# Patient Record
Sex: MALE | Race: WHITE | Employment: OTHER | URBAN - METROPOLITAN AREA
[De-identification: names, ages, dates, MRNs, and addresses within clinical notes are randomized per-mention and may not be internally consistent; named-entity substitution may affect disease eponyms.]

---

## 2017-03-10 ENCOUNTER — HOSPITAL ENCOUNTER (OUTPATIENT)
Dept: OTHER | Age: 77
Discharge: HOME | End: 2017-03-10
Attending: FAMILY MEDICINE | Admitting: FAMILY MEDICINE

## 2017-03-11 LAB
A/G RATIO: 1.2 (ref 1.1–2.2)
ALBUMIN SERPL-MCNC: 4.2 G/DL (ref 3.4–5)
ALP BLD-CCNC: 97 U/L (ref 40–129)
ALT SERPL-CCNC: 23 U/L (ref 10–40)
ANION GAP SERPL CALCULATED.3IONS-SCNC: 13 MMOL/L (ref 3–16)
AST SERPL-CCNC: 17 U/L (ref 15–37)
BASOPHILS ABSOLUTE: 0.2 K/UL (ref 0–0.2)
BASOPHILS RELATIVE PERCENT: 1.9 %
BILIRUB SERPL-MCNC: 0.4 MG/DL (ref 0–1)
BUN BLDV-MCNC: 25 MG/DL (ref 7–20)
CALCIUM SERPL-MCNC: 9.9 MG/DL (ref 8.3–10.6)
CHLORIDE BLD-SCNC: 96 MMOL/L (ref 99–110)
CO2: 26 MMOL/L (ref 21–32)
CREAT SERPL-MCNC: 1.3 MG/DL (ref 0.8–1.3)
EOSINOPHILS ABSOLUTE: 0.3 K/UL (ref 0–0.6)
EOSINOPHILS RELATIVE PERCENT: 2.8 %
ESTIMATED AVERAGE GLUCOSE: 274.7 MG/DL
GFR AFRICAN AMERICAN: >60
GFR NON-AFRICAN AMERICAN: 54
GLOBULIN: 3.6 G/DL
GLUCOSE BLD-MCNC: 298 MG/DL (ref 70–99)
HBA1C MFR BLD: 11.2 %
HCT VFR BLD CALC: 48.2 % (ref 40.5–52.5)
HEMOGLOBIN: 15.9 G/DL (ref 13.5–17.5)
LYMPHOCYTES ABSOLUTE: 3.3 K/UL (ref 1–5.1)
LYMPHOCYTES RELATIVE PERCENT: 28.4 %
MCH RBC QN AUTO: 30 PG (ref 26–34)
MCHC RBC AUTO-ENTMCNC: 33.1 G/DL (ref 31–36)
MCV RBC AUTO: 90.8 FL (ref 80–100)
MONOCYTES ABSOLUTE: 0.8 K/UL (ref 0–1.3)
MONOCYTES RELATIVE PERCENT: 6.9 %
NEUTROPHILS ABSOLUTE: 6.9 K/UL (ref 1.7–7.7)
NEUTROPHILS RELATIVE PERCENT: 60 %
PDW BLD-RTO: 15.2 % (ref 12.4–15.4)
PLATELET # BLD: 270 K/UL (ref 135–450)
PMV BLD AUTO: 8.8 FL (ref 5–10.5)
POTASSIUM SERPL-SCNC: 5.1 MMOL/L (ref 3.5–5.1)
RBC # BLD: 5.31 M/UL (ref 4.2–5.9)
SODIUM BLD-SCNC: 135 MMOL/L (ref 136–145)
TOTAL PROTEIN: 7.8 G/DL (ref 6.4–8.2)
WBC # BLD: 11.5 K/UL (ref 4–11)

## 2017-03-13 ENCOUNTER — HOSPITAL ENCOUNTER (OUTPATIENT)
Dept: PREADMISSION TESTING | Age: 77
Discharge: HOME | End: 2017-03-13
Admitting: PODIATRIST

## 2017-03-13 VITALS — HEIGHT: 69 IN | BODY MASS INDEX: 33.03 KG/M2 | WEIGHT: 223 LBS

## 2017-03-13 RX ORDER — CHLORHEXIDINE GLUCONATE 0.12 MG/ML
15 RINSE ORAL 2 TIMES DAILY
Status: CANCELLED | OUTPATIENT
Start: 2017-03-13

## 2017-03-16 RX ORDER — ONDANSETRON 2 MG/ML
4 INJECTION INTRAMUSCULAR; INTRAVENOUS ONCE
Status: CANCELLED | OUTPATIENT
Start: 2017-03-16 | End: 2017-03-16

## 2017-03-16 RX ORDER — ROPIVACAINE HYDROCHLORIDE 5 MG/ML
30 INJECTION, SOLUTION EPIDURAL; INFILTRATION; PERINEURAL ONCE
Status: CANCELLED | OUTPATIENT
Start: 2017-03-16 | End: 2017-03-16

## 2017-03-16 RX ORDER — MIDAZOLAM HYDROCHLORIDE 1 MG/ML
2 INJECTION INTRAMUSCULAR; INTRAVENOUS
Status: CANCELLED | OUTPATIENT
Start: 2017-03-16 | End: 2017-03-16

## 2017-03-16 RX ORDER — GLYCOPYRROLATE 0.2 MG/ML
0.1 INJECTION INTRAMUSCULAR; INTRAVENOUS ONCE
Status: CANCELLED | OUTPATIENT
Start: 2017-03-16 | End: 2017-03-16

## 2017-03-16 RX ORDER — SODIUM CHLORIDE 0.9 % (FLUSH) 0.9 %
10 SYRINGE (ML) INJECTION EVERY 12 HOURS SCHEDULED
Status: CANCELLED | OUTPATIENT
Start: 2017-03-16

## 2017-03-16 RX ORDER — FENTANYL CITRATE 50 UG/ML
100 INJECTION, SOLUTION INTRAMUSCULAR; INTRAVENOUS ONCE
Status: CANCELLED | OUTPATIENT
Start: 2017-03-16 | End: 2017-03-16

## 2017-03-16 RX ORDER — SODIUM CHLORIDE, SODIUM LACTATE, POTASSIUM CHLORIDE, CALCIUM CHLORIDE 600; 310; 30; 20 MG/100ML; MG/100ML; MG/100ML; MG/100ML
INJECTION, SOLUTION INTRAVENOUS CONTINUOUS
Status: CANCELLED | OUTPATIENT
Start: 2017-03-16

## 2017-03-16 RX ORDER — DEXAMETHASONE SODIUM PHOSPHATE 4 MG/ML
10 INJECTION, SOLUTION INTRA-ARTICULAR; INTRALESIONAL; INTRAMUSCULAR; INTRAVENOUS; SOFT TISSUE ONCE
Status: CANCELLED | OUTPATIENT
Start: 2017-03-16 | End: 2017-03-16

## 2017-03-16 RX ORDER — SODIUM CHLORIDE 0.9 % (FLUSH) 0.9 %
10 SYRINGE (ML) INJECTION PRN
Status: CANCELLED | OUTPATIENT
Start: 2017-03-16

## 2017-03-17 ENCOUNTER — HOSPITAL ENCOUNTER (OUTPATIENT)
Dept: SURGERY | Age: 77
Discharge: HOME | End: 2017-03-17
Admitting: PODIATRIST

## 2017-03-28 ENCOUNTER — HOSPITAL ENCOUNTER (OUTPATIENT)
Dept: PREADMISSION TESTING | Age: 77
Discharge: HOME | End: 2017-03-28
Attending: PODIATRIST | Admitting: PODIATRIST

## 2017-03-28 VITALS — WEIGHT: 223 LBS | BODY MASS INDEX: 33.03 KG/M2 | HEIGHT: 69 IN

## 2017-03-31 ENCOUNTER — HOSPITAL ENCOUNTER (OUTPATIENT)
Dept: SURGERY | Age: 77
Discharge: HOME | End: 2017-03-31
Admitting: PODIATRIST

## 2017-03-31 VITALS
OXYGEN SATURATION: 92 % | SYSTOLIC BLOOD PRESSURE: 134 MMHG | BODY MASS INDEX: 33.03 KG/M2 | RESPIRATION RATE: 19 BRPM | WEIGHT: 223 LBS | HEART RATE: 84 BPM | HEIGHT: 69 IN | TEMPERATURE: 97.1 F | DIASTOLIC BLOOD PRESSURE: 59 MMHG

## 2017-03-31 DIAGNOSIS — G89.18 POST-OP PAIN: Primary | ICD-10-CM

## 2017-03-31 LAB
GLUCOSE BLD-MCNC: 120 MG/DL (ref 70–99)
GLUCOSE BLD-MCNC: 163 MG/DL (ref 70–99)
PERFORMED ON: ABNORMAL
PERFORMED ON: ABNORMAL

## 2017-03-31 RX ORDER — ONDANSETRON 2 MG/ML
4 INJECTION INTRAMUSCULAR; INTRAVENOUS ONCE
Status: COMPLETED | OUTPATIENT
Start: 2017-03-31 | End: 2017-03-31

## 2017-03-31 RX ORDER — FENTANYL CITRATE 50 UG/ML
100 INJECTION, SOLUTION INTRAMUSCULAR; INTRAVENOUS ONCE
Status: COMPLETED | OUTPATIENT
Start: 2017-03-31 | End: 2017-03-31

## 2017-03-31 RX ORDER — DEXAMETHASONE SODIUM PHOSPHATE 4 MG/ML
10 INJECTION, SOLUTION INTRA-ARTICULAR; INTRALESIONAL; INTRAMUSCULAR; INTRAVENOUS; SOFT TISSUE ONCE
Status: COMPLETED | OUTPATIENT
Start: 2017-03-31 | End: 2017-03-31

## 2017-03-31 RX ORDER — ROPIVACAINE HYDROCHLORIDE 5 MG/ML
INJECTION, SOLUTION EPIDURAL; INFILTRATION; PERINEURAL
Status: DISPENSED
Start: 2017-03-31 | End: 2017-03-31

## 2017-03-31 RX ORDER — MEPERIDINE HYDROCHLORIDE 25 MG/ML
12.5 INJECTION INTRAMUSCULAR; INTRAVENOUS; SUBCUTANEOUS EVERY 5 MIN PRN
Status: DISCONTINUED | OUTPATIENT
Start: 2017-03-31 | End: 2017-04-01 | Stop reason: HOSPADM

## 2017-03-31 RX ORDER — SODIUM CHLORIDE 0.9 % (FLUSH) 0.9 %
10 SYRINGE (ML) INJECTION EVERY 12 HOURS SCHEDULED
Status: DISCONTINUED | OUTPATIENT
Start: 2017-03-31 | End: 2017-04-01 | Stop reason: HOSPADM

## 2017-03-31 RX ORDER — PROMETHAZINE HYDROCHLORIDE 25 MG/ML
6.25 INJECTION, SOLUTION INTRAMUSCULAR; INTRAVENOUS
Status: ACTIVE | OUTPATIENT
Start: 2017-03-31 | End: 2017-03-31

## 2017-03-31 RX ORDER — OXYCODONE HYDROCHLORIDE AND ACETAMINOPHEN 5; 325 MG/1; MG/1
1 TABLET ORAL ONCE
Status: DISCONTINUED | OUTPATIENT
Start: 2017-03-31 | End: 2017-04-01 | Stop reason: HOSPADM

## 2017-03-31 RX ORDER — SODIUM CHLORIDE 0.9 % (FLUSH) 0.9 %
10 SYRINGE (ML) INJECTION PRN
Status: DISCONTINUED | OUTPATIENT
Start: 2017-03-31 | End: 2017-04-01 | Stop reason: HOSPADM

## 2017-03-31 RX ORDER — GLYCOPYRROLATE 0.2 MG/ML
0.1 INJECTION INTRAMUSCULAR; INTRAVENOUS ONCE
Status: COMPLETED | OUTPATIENT
Start: 2017-03-31 | End: 2017-03-31

## 2017-03-31 RX ORDER — IBUPROFEN 800 MG/1
800 TABLET ORAL EVERY 8 HOURS PRN
Qty: 30 TABLET | Refills: 0 | Status: ON HOLD | OUTPATIENT
Start: 2017-03-31 | End: 2020-10-14 | Stop reason: HOSPADM

## 2017-03-31 RX ORDER — DIPHENHYDRAMINE HYDROCHLORIDE 50 MG/ML
12.5 INJECTION INTRAMUSCULAR; INTRAVENOUS
Status: ACTIVE | OUTPATIENT
Start: 2017-03-31 | End: 2017-03-31

## 2017-03-31 RX ORDER — CHLORHEXIDINE GLUCONATE 0.12 MG/ML
15 RINSE ORAL 2 TIMES DAILY
Status: DISCONTINUED | OUTPATIENT
Start: 2017-03-31 | End: 2017-04-01 | Stop reason: HOSPADM

## 2017-03-31 RX ORDER — OXYCODONE HYDROCHLORIDE AND ACETAMINOPHEN 5; 325 MG/1; MG/1
1 TABLET ORAL EVERY 6 HOURS PRN
Qty: 50 TABLET | Refills: 0 | Status: SHIPPED | OUTPATIENT
Start: 2017-03-31 | End: 2017-04-07

## 2017-03-31 RX ORDER — ROPIVACAINE HYDROCHLORIDE 5 MG/ML
30 INJECTION, SOLUTION EPIDURAL; INFILTRATION; PERINEURAL ONCE
Status: DISCONTINUED | OUTPATIENT
Start: 2017-03-31 | End: 2017-04-01 | Stop reason: HOSPADM

## 2017-03-31 RX ORDER — SODIUM CHLORIDE, SODIUM LACTATE, POTASSIUM CHLORIDE, CALCIUM CHLORIDE 600; 310; 30; 20 MG/100ML; MG/100ML; MG/100ML; MG/100ML
INJECTION, SOLUTION INTRAVENOUS CONTINUOUS
Status: DISCONTINUED | OUTPATIENT
Start: 2017-03-31 | End: 2017-04-01 | Stop reason: HOSPADM

## 2017-03-31 RX ORDER — LABETALOL HYDROCHLORIDE 5 MG/ML
5 INJECTION, SOLUTION INTRAVENOUS EVERY 10 MIN PRN
Status: DISCONTINUED | OUTPATIENT
Start: 2017-03-31 | End: 2017-04-01 | Stop reason: HOSPADM

## 2017-03-31 RX ORDER — HYDRALAZINE HYDROCHLORIDE 20 MG/ML
5 INJECTION INTRAMUSCULAR; INTRAVENOUS EVERY 10 MIN PRN
Status: DISCONTINUED | OUTPATIENT
Start: 2017-03-31 | End: 2017-04-01 | Stop reason: HOSPADM

## 2017-03-31 RX ORDER — ONDANSETRON 2 MG/ML
4 INJECTION INTRAMUSCULAR; INTRAVENOUS
Status: ACTIVE | OUTPATIENT
Start: 2017-03-31 | End: 2017-03-31

## 2017-03-31 RX ORDER — MIDAZOLAM HYDROCHLORIDE 1 MG/ML
2 INJECTION INTRAMUSCULAR; INTRAVENOUS
Status: COMPLETED | OUTPATIENT
Start: 2017-03-31 | End: 2017-03-31

## 2017-03-31 RX ORDER — FENTANYL CITRATE 50 UG/ML
25 INJECTION, SOLUTION INTRAMUSCULAR; INTRAVENOUS EVERY 5 MIN PRN
Status: DISCONTINUED | OUTPATIENT
Start: 2017-03-31 | End: 2017-04-01 | Stop reason: HOSPADM

## 2017-03-31 RX ADMIN — DEXAMETHASONE SODIUM PHOSPHATE 10 MG: 4 INJECTION, SOLUTION INTRA-ARTICULAR; INTRALESIONAL; INTRAMUSCULAR; INTRAVENOUS; SOFT TISSUE at 12:55

## 2017-03-31 RX ADMIN — FENTANYL CITRATE 100 MCG: 50 INJECTION, SOLUTION INTRAMUSCULAR; INTRAVENOUS at 12:51

## 2017-03-31 RX ADMIN — MIDAZOLAM HYDROCHLORIDE 2 MG: 1 INJECTION INTRAMUSCULAR; INTRAVENOUS at 12:50

## 2017-03-31 RX ADMIN — ONDANSETRON 4 MG: 2 INJECTION INTRAMUSCULAR; INTRAVENOUS at 12:54

## 2017-03-31 RX ADMIN — GLYCOPYRROLATE 0.1 MG: 0.2 INJECTION INTRAMUSCULAR; INTRAVENOUS at 12:59

## 2017-03-31 RX ADMIN — SODIUM CHLORIDE, SODIUM LACTATE, POTASSIUM CHLORIDE, CALCIUM CHLORIDE: 600; 310; 30; 20 INJECTION, SOLUTION INTRAVENOUS at 12:30

## 2017-03-31 ASSESSMENT — PAIN SCALES - GENERAL
PAINLEVEL_OUTOF10: 0

## 2017-03-31 ASSESSMENT — PAIN - FUNCTIONAL ASSESSMENT
PAIN_FUNCTIONAL_ASSESSMENT: 0-10

## 2017-05-04 ENCOUNTER — HOSPITAL ENCOUNTER (OUTPATIENT)
Dept: CT IMAGING | Age: 77
Discharge: HOME | End: 2017-05-04
Attending: UROLOGY | Admitting: UROLOGY

## 2017-05-04 DIAGNOSIS — C64.9 MALIGNANT NEOPLASM OF KIDNEY, EXCEPT PELVIS: ICD-10-CM

## 2017-05-04 DIAGNOSIS — N28.9 UNSPECIFIED DISORDER OF KIDNEY AND URETER: ICD-10-CM

## 2017-05-04 DIAGNOSIS — C64.9 MALIGNANT NEOPLASM OF KIDNEY EXCLUDING RENAL PELVIS (HCC): ICD-10-CM

## 2017-05-04 LAB
BUN BLDV-MCNC: 17 MG/DL (ref 7–20)
CREAT SERPL-MCNC: 1.3 MG/DL (ref 0.8–1.3)
GFR AFRICAN AMERICAN: >60
GFR NON-AFRICAN AMERICAN: 54

## 2017-06-05 ENCOUNTER — HOSPITAL ENCOUNTER (OUTPATIENT)
Dept: WOUND CARE | Age: 77
Discharge: HOME | End: 2017-06-05
Attending: PODIATRIST | Admitting: PODIATRIST

## 2017-06-05 VITALS
SYSTOLIC BLOOD PRESSURE: 148 MMHG | DIASTOLIC BLOOD PRESSURE: 76 MMHG | HEART RATE: 83 BPM | RESPIRATION RATE: 20 BRPM | TEMPERATURE: 96.8 F

## 2017-11-02 ENCOUNTER — HOSPITAL ENCOUNTER (OUTPATIENT)
Dept: VASCULAR LAB | Age: 77
Discharge: HOME | End: 2017-11-02
Attending: FAMILY MEDICINE | Admitting: FAMILY MEDICINE

## 2017-11-02 DIAGNOSIS — I25.10 ATHEROSCLEROTIC HEART DISEASE OF NATIVE CORONARY ARTERY WITHOUT ANGINA PECTORIS: ICD-10-CM

## 2017-11-02 LAB
LV EF: 53 %
LVEF MODALITY: NORMAL

## 2018-05-03 ENCOUNTER — OFFICE VISIT (OUTPATIENT)
Dept: PULMONOLOGY | Age: 78
End: 2018-05-03

## 2018-05-03 VITALS
SYSTOLIC BLOOD PRESSURE: 122 MMHG | DIASTOLIC BLOOD PRESSURE: 76 MMHG | WEIGHT: 234.4 LBS | OXYGEN SATURATION: 96 % | BODY MASS INDEX: 33.56 KG/M2 | HEIGHT: 70 IN | RESPIRATION RATE: 20 BRPM | HEART RATE: 71 BPM | TEMPERATURE: 98.2 F

## 2018-05-03 DIAGNOSIS — R53.83 FATIGUE, UNSPECIFIED TYPE: ICD-10-CM

## 2018-05-03 DIAGNOSIS — G47.10 HYPERSOMNIA: ICD-10-CM

## 2018-05-03 DIAGNOSIS — R06.83 SNORING: ICD-10-CM

## 2018-05-03 DIAGNOSIS — G47.30 OBSERVED SLEEP APNEA: Primary | ICD-10-CM

## 2018-05-03 PROCEDURE — 99214 OFFICE O/P EST MOD 30 MIN: CPT | Performed by: INTERNAL MEDICINE

## 2018-05-03 ASSESSMENT — SLEEP AND FATIGUE QUESTIONNAIRES
HOW LIKELY ARE YOU TO NOD OFF OR FALL ASLEEP WHILE LYING DOWN TO REST IN THE AFTERNOON WHEN CIRCUMSTANCES PERMIT: 2
HOW LIKELY ARE YOU TO NOD OFF OR FALL ASLEEP WHEN YOU ARE A PASSENGER IN A CAR FOR AN HOUR WITHOUT A BREAK: 0
HOW LIKELY ARE YOU TO NOD OFF OR FALL ASLEEP WHILE SITTING QUIETLY AFTER LUNCH WITHOUT ALCOHOL: 2
HOW LIKELY ARE YOU TO NOD OFF OR FALL ASLEEP WHILE SITTING INACTIVE IN A PUBLIC PLACE: 0
NECK CIRCUMFERENCE (INCHES): 17
ESS TOTAL SCORE: 10
HOW LIKELY ARE YOU TO NOD OFF OR FALL ASLEEP WHILE SITTING AND READING: 3
HOW LIKELY ARE YOU TO NOD OFF OR FALL ASLEEP WHILE SITTING AND TALKING TO SOMEONE: 0
HOW LIKELY ARE YOU TO NOD OFF OR FALL ASLEEP WHILE WATCHING TV: 3
HOW LIKELY ARE YOU TO NOD OFF OR FALL ASLEEP IN A CAR, WHILE STOPPED FOR A FEW MINUTES IN TRAFFIC: 0

## 2018-05-30 ENCOUNTER — HOSPITAL ENCOUNTER (OUTPATIENT)
Dept: SLEEP MEDICINE | Age: 78
Discharge: HOME | End: 2018-05-30
Attending: INTERNAL MEDICINE | Admitting: INTERNAL MEDICINE

## 2018-05-30 DIAGNOSIS — R06.83 SNORING: ICD-10-CM

## 2018-05-30 DIAGNOSIS — R53.83 FATIGUE, UNSPECIFIED TYPE: ICD-10-CM

## 2018-05-30 DIAGNOSIS — G47.30 OBSERVED SLEEP APNEA: ICD-10-CM

## 2018-05-30 DIAGNOSIS — G47.10 HYPERSOMNIA: ICD-10-CM

## 2018-05-31 ENCOUNTER — TELEPHONE (OUTPATIENT)
Dept: PULMONOLOGY | Age: 78
End: 2018-05-31

## 2018-05-31 DIAGNOSIS — G47.33 OSA (OBSTRUCTIVE SLEEP APNEA): Primary | ICD-10-CM

## 2018-06-14 ENCOUNTER — HOSPITAL ENCOUNTER (OUTPATIENT)
Dept: SLEEP MEDICINE | Age: 78
Discharge: HOME | End: 2018-06-16
Attending: INTERNAL MEDICINE | Admitting: INTERNAL MEDICINE

## 2018-06-14 DIAGNOSIS — G47.33 OSA (OBSTRUCTIVE SLEEP APNEA): ICD-10-CM

## 2018-06-27 DIAGNOSIS — G47.33 OSA (OBSTRUCTIVE SLEEP APNEA): Primary | ICD-10-CM

## 2018-09-07 ENCOUNTER — TELEPHONE (OUTPATIENT)
Dept: PULMONOLOGY | Age: 78
End: 2018-09-07

## 2018-09-07 NOTE — TELEPHONE ENCOUNTER
Patient scheduled 9/12/18 for 31-90 day.  Patient was informed to keep this appt to discuss alternatives

## 2018-09-12 ENCOUNTER — OFFICE VISIT (OUTPATIENT)
Dept: PULMONOLOGY | Age: 78
End: 2018-09-12

## 2018-09-12 VITALS
BODY MASS INDEX: 32.62 KG/M2 | HEART RATE: 60 BPM | SYSTOLIC BLOOD PRESSURE: 124 MMHG | WEIGHT: 233 LBS | RESPIRATION RATE: 20 BRPM | HEIGHT: 71 IN | OXYGEN SATURATION: 98 % | TEMPERATURE: 96.9 F | DIASTOLIC BLOOD PRESSURE: 78 MMHG

## 2018-09-12 DIAGNOSIS — G47.33 MODERATE OBSTRUCTIVE SLEEP APNEA: Primary | ICD-10-CM

## 2018-09-12 DIAGNOSIS — G47.61 PLMD (PERIODIC LIMB MOVEMENT DISORDER): ICD-10-CM

## 2018-09-12 PROCEDURE — 99214 OFFICE O/P EST MOD 30 MIN: CPT | Performed by: INTERNAL MEDICINE

## 2018-09-12 RX ORDER — ROPINIROLE 0.5 MG/1
0.5 TABLET, FILM COATED ORAL NIGHTLY
Qty: 30 TABLET | Refills: 6 | Status: SHIPPED | OUTPATIENT
Start: 2018-09-12

## 2018-09-12 ASSESSMENT — SLEEP AND FATIGUE QUESTIONNAIRES
HOW LIKELY ARE YOU TO NOD OFF OR FALL ASLEEP WHILE LYING DOWN TO REST IN THE AFTERNOON WHEN CIRCUMSTANCES PERMIT: 3
HOW LIKELY ARE YOU TO NOD OFF OR FALL ASLEEP IN A CAR, WHILE STOPPED FOR A FEW MINUTES IN TRAFFIC: 0
HOW LIKELY ARE YOU TO NOD OFF OR FALL ASLEEP WHILE SITTING INACTIVE IN A PUBLIC PLACE: 0
NECK CIRCUMFERENCE (INCHES): 18
ESS TOTAL SCORE: 5
HOW LIKELY ARE YOU TO NOD OFF OR FALL ASLEEP WHILE SITTING AND TALKING TO SOMEONE: 0
HOW LIKELY ARE YOU TO NOD OFF OR FALL ASLEEP WHILE WATCHING TV: 2
HOW LIKELY ARE YOU TO NOD OFF OR FALL ASLEEP WHILE SITTING QUIETLY AFTER LUNCH WITHOUT ALCOHOL: 0
HOW LIKELY ARE YOU TO NOD OFF OR FALL ASLEEP WHILE SITTING AND READING: 0
HOW LIKELY ARE YOU TO NOD OFF OR FALL ASLEEP WHEN YOU ARE A PASSENGER IN A CAR FOR AN HOUR WITHOUT A BREAK: 0

## 2018-09-12 NOTE — PROGRESS NOTES
(ADVIL;MOTRIN) 800 MG tablet, Take 1 tablet by mouth every 8 hours as needed for Pain, Disp: 30 tablet, Rfl: 0    omeprazole (PRILOSEC) 20 MG capsule, Take 40 mg by mouth daily , Disp: , Rfl:     simvastatin (ZOCOR) 40 MG tablet, Take 40 mg by mouth nightly, Disp: , Rfl:     lisinopril (PRINIVIL;ZESTRIL) 5 MG tablet, Take 5 mg by mouth daily, Disp: , Rfl:     insulin detemir (LEVEMIR) 100 UNIT/ML injection vial, Inject 58 Units into the skin nightly , Disp: , Rfl:     glipiZIDE (GLUCOTROL) 10 MG tablet, Take 5 mg by mouth 2 times daily (before meals), Disp: , Rfl:     metoprolol (LOPRESSOR) 25 MG tablet, Take 25 mg by mouth 2 times daily. Indications: Take DOS, Disp: , Rfl:     aspirin 81 MG chewable tablet, Take 81 mg by mouth daily. Indications: stopped 01-, Disp: , Rfl:             Objective:   PHYSICAL EXAM:    Blood pressure 124/78, pulse 60, temperature 96.9 °F (36.1 °C), temperature source Oral, resp. rate 20, height 5' 10.5\" (1.791 m), weight 233 lb (105.7 kg), SpO2 98 %.' on RA  Gen: No distress. Obese. BMI of 32.96  Eyes: PERRL. No sclera icterus. No conjunctival injection. ENT: No discharge. Pharynx clear. Mallampati class IV. Neck: Trachea midline. No obvious mass. Neck circumference 18\"  Resp: No accessory muscle use. No crackles. No wheezes. No rhonchi. No dullness on percussion. Good air entry. CV: Regular rate. Regular rhythm. No murmur or rub. No edema. GI: Non-tender. Non-distended. No hernia. Skin: Warm and dry. No nodule on exposed extremities. Lymph: No cervical LAD. No supraclavicular LAD. M/S: No cyanosis. No joint deformity. No clubbing. Neuro: Awake. Alert. Moves all four extremities. Psych: Oriented x 3. No anxiety. DATA reviewed by me:   PSG 5/30/18 AHI 16.6 and desaturation to 85%. PLMS index of 126  CPAP titration 6/14/18 APAP 8-12 cm H2O    CPAP data reviewed by me and showed poor compliance. Assessment:       · Moderate [place]. APAP 8-12.   Poor

## 2018-11-09 ENCOUNTER — TELEPHONE (OUTPATIENT)
Dept: PULMONOLOGY | Age: 78
End: 2018-11-09

## 2019-06-13 ENCOUNTER — HOSPITAL ENCOUNTER (OUTPATIENT)
Dept: GENERAL RADIOLOGY | Age: 79
Discharge: HOME | End: 2019-06-13
Payer: MEDICARE

## 2019-06-13 ENCOUNTER — HOSPITAL ENCOUNTER (OUTPATIENT)
Age: 79
Discharge: HOME | End: 2019-06-13
Payer: MEDICARE

## 2019-06-13 DIAGNOSIS — R14.0 ABDOMINAL BLOATING: ICD-10-CM

## 2019-06-13 PROCEDURE — 74018 RADEX ABDOMEN 1 VIEW: CPT

## 2019-07-17 ENCOUNTER — HOSPITAL ENCOUNTER (INPATIENT)
Age: 79
LOS: 5 days | Discharge: HOME | DRG: 446 | End: 2019-07-22
Attending: EMERGENCY MEDICINE | Admitting: INTERNAL MEDICINE
Payer: MEDICARE

## 2019-07-17 ENCOUNTER — APPOINTMENT (OUTPATIENT)
Dept: CT IMAGING | Age: 79
DRG: 446 | End: 2019-07-17
Payer: MEDICARE

## 2019-07-17 DIAGNOSIS — R17 ELEVATED BILIRUBIN: ICD-10-CM

## 2019-07-17 DIAGNOSIS — K81.0 ACUTE CHOLECYSTITIS: Primary | ICD-10-CM

## 2019-07-17 DIAGNOSIS — K80.20 GALL STONES: ICD-10-CM

## 2019-07-17 DIAGNOSIS — R74.8 ELEVATED LIVER ENZYMES: ICD-10-CM

## 2019-07-17 LAB
A/G RATIO: 1.1 (ref 1.1–2.2)
ACETAMINOPHEN LEVEL: <5 UG/ML (ref 10–30)
ALBUMIN SERPL-MCNC: 4 G/DL (ref 3.4–5)
ALBUMIN SERPL-MCNC: 4.1 G/DL (ref 3.4–5)
ALP BLD-CCNC: 232 U/L (ref 40–129)
ALP BLD-CCNC: 235 U/L (ref 40–129)
ALT SERPL-CCNC: 123 U/L (ref 10–40)
ALT SERPL-CCNC: 124 U/L (ref 10–40)
ANION GAP SERPL CALCULATED.3IONS-SCNC: 15 MMOL/L (ref 3–16)
APTT: 33.3 SEC (ref 26–36)
AST SERPL-CCNC: 125 U/L (ref 15–37)
AST SERPL-CCNC: 127 U/L (ref 15–37)
BACTERIA: ABNORMAL /HPF
BASOPHILS ABSOLUTE: 0.1 K/UL (ref 0–0.2)
BASOPHILS RELATIVE PERCENT: 0.6 %
BILIRUB SERPL-MCNC: 7.7 MG/DL (ref 0–1)
BILIRUB SERPL-MCNC: 7.8 MG/DL (ref 0–1)
BILIRUBIN DIRECT: 5.4 MG/DL (ref 0–0.3)
BILIRUBIN URINE: ABNORMAL
BILIRUBIN, INDIRECT: 2.3 MG/DL (ref 0–1)
BLOOD, URINE: ABNORMAL
BUN BLDV-MCNC: 17 MG/DL (ref 7–20)
CALCIUM SERPL-MCNC: 9 MG/DL (ref 8.3–10.6)
CHLORIDE BLD-SCNC: 93 MMOL/L (ref 99–110)
CLARITY: CLEAR
CO2: 25 MMOL/L (ref 21–32)
COLOR: ABNORMAL
CREAT SERPL-MCNC: 1.1 MG/DL (ref 0.8–1.3)
EOSINOPHILS ABSOLUTE: 0 K/UL (ref 0–0.6)
EOSINOPHILS RELATIVE PERCENT: 0.1 %
EPITHELIAL CELLS, UA: ABNORMAL /HPF
GFR AFRICAN AMERICAN: >60
GFR NON-AFRICAN AMERICAN: >60
GLOBULIN: 3.9 G/DL
GLUCOSE BLD-MCNC: 156 MG/DL (ref 70–99)
GLUCOSE BLD-MCNC: 195 MG/DL (ref 70–99)
GLUCOSE URINE: 250 MG/DL
HCT VFR BLD CALC: 46 % (ref 40.5–52.5)
HEMOGLOBIN: 15.8 G/DL (ref 13.5–17.5)
INR BLD: 1.27 (ref 0.86–1.14)
KETONES, URINE: ABNORMAL MG/DL
LACTIC ACID, SEPSIS: 2 MMOL/L (ref 0.4–1.9)
LEUKOCYTE ESTERASE, URINE: NEGATIVE
LIPASE: 8 U/L (ref 13–60)
LYMPHOCYTES ABSOLUTE: 0.6 K/UL (ref 1–5.1)
LYMPHOCYTES RELATIVE PERCENT: 4.6 %
MCH RBC QN AUTO: 31.1 PG (ref 26–34)
MCHC RBC AUTO-ENTMCNC: 34.4 G/DL (ref 31–36)
MCV RBC AUTO: 90.3 FL (ref 80–100)
MICROSCOPIC EXAMINATION: YES
MONOCYTES ABSOLUTE: 1.3 K/UL (ref 0–1.3)
MONOCYTES RELATIVE PERCENT: 9.2 %
MUCUS: ABNORMAL /LPF
NEUTROPHILS ABSOLUTE: 11.7 K/UL (ref 1.7–7.7)
NEUTROPHILS RELATIVE PERCENT: 85.5 %
NITRITE, URINE: POSITIVE
PDW BLD-RTO: 14.9 % (ref 12.4–15.4)
PERFORMED ON: ABNORMAL
PH UA: 6 (ref 5–8)
PLATELET # BLD: 183 K/UL (ref 135–450)
PMV BLD AUTO: 7.3 FL (ref 5–10.5)
POTASSIUM SERPL-SCNC: 4 MMOL/L (ref 3.5–5.1)
PROTEIN UA: 100 MG/DL
PROTHROMBIN TIME: 14.5 SEC (ref 9.8–13)
RBC # BLD: 5.1 M/UL (ref 4.2–5.9)
RBC UA: ABNORMAL /HPF (ref 0–2)
SODIUM BLD-SCNC: 133 MMOL/L (ref 136–145)
SPECIFIC GRAVITY UA: 1.02 (ref 1–1.03)
TOTAL PROTEIN: 8 G/DL (ref 6.4–8.2)
TOTAL PROTEIN: 8.1 G/DL (ref 6.4–8.2)
TROPONIN: <0.01 NG/ML
URINE REFLEX TO CULTURE: YES
URINE TYPE: ABNORMAL
UROBILINOGEN, URINE: >=8 E.U./DL
WBC # BLD: 13.7 K/UL (ref 4–11)
WBC UA: ABNORMAL /HPF (ref 0–5)

## 2019-07-17 PROCEDURE — 6360000004 HC RX CONTRAST MEDICATION: Performed by: EMERGENCY MEDICINE

## 2019-07-17 PROCEDURE — 36415 COLL VENOUS BLD VENIPUNCTURE: CPT

## 2019-07-17 PROCEDURE — 96360 HYDRATION IV INFUSION INIT: CPT

## 2019-07-17 PROCEDURE — 85610 PROTHROMBIN TIME: CPT

## 2019-07-17 PROCEDURE — 83605 ASSAY OF LACTIC ACID: CPT

## 2019-07-17 PROCEDURE — 74177 CT ABD & PELVIS W/CONTRAST: CPT

## 2019-07-17 PROCEDURE — 83690 ASSAY OF LIPASE: CPT

## 2019-07-17 PROCEDURE — 80053 COMPREHEN METABOLIC PANEL: CPT

## 2019-07-17 PROCEDURE — 99285 EMERGENCY DEPT VISIT HI MDM: CPT

## 2019-07-17 PROCEDURE — 81001 URINALYSIS AUTO W/SCOPE: CPT

## 2019-07-17 PROCEDURE — 2060000000 HC ICU INTERMEDIATE R&B

## 2019-07-17 PROCEDURE — 93005 ELECTROCARDIOGRAM TRACING: CPT | Performed by: EMERGENCY MEDICINE

## 2019-07-17 PROCEDURE — 85025 COMPLETE CBC W/AUTO DIFF WBC: CPT

## 2019-07-17 PROCEDURE — 80074 ACUTE HEPATITIS PANEL: CPT

## 2019-07-17 PROCEDURE — 2580000003 HC RX 258: Performed by: EMERGENCY MEDICINE

## 2019-07-17 PROCEDURE — G0480 DRUG TEST DEF 1-7 CLASSES: HCPCS

## 2019-07-17 PROCEDURE — 87040 BLOOD CULTURE FOR BACTERIA: CPT

## 2019-07-17 PROCEDURE — 84484 ASSAY OF TROPONIN QUANT: CPT

## 2019-07-17 PROCEDURE — 85730 THROMBOPLASTIN TIME PARTIAL: CPT

## 2019-07-17 PROCEDURE — 87086 URINE CULTURE/COLONY COUNT: CPT

## 2019-07-17 RX ORDER — 0.9 % SODIUM CHLORIDE 0.9 %
1000 INTRAVENOUS SOLUTION INTRAVENOUS ONCE
Status: COMPLETED | OUTPATIENT
Start: 2019-07-17 | End: 2019-07-18

## 2019-07-17 RX ORDER — SIMVASTATIN 40 MG
40 TABLET ORAL NIGHTLY
Status: DISCONTINUED | OUTPATIENT
Start: 2019-07-17 | End: 2019-07-20

## 2019-07-17 RX ORDER — SODIUM CHLORIDE 0.9 % (FLUSH) 0.9 %
10 SYRINGE (ML) INJECTION PRN
Status: DISCONTINUED | OUTPATIENT
Start: 2019-07-17 | End: 2019-07-22 | Stop reason: HOSPADM

## 2019-07-17 RX ORDER — SODIUM CHLORIDE 0.9 % (FLUSH) 0.9 %
10 SYRINGE (ML) INJECTION EVERY 12 HOURS SCHEDULED
Status: DISCONTINUED | OUTPATIENT
Start: 2019-07-17 | End: 2019-07-22 | Stop reason: HOSPADM

## 2019-07-17 RX ORDER — ASPIRIN 81 MG/1
81 TABLET, CHEWABLE ORAL DAILY
Status: DISCONTINUED | OUTPATIENT
Start: 2019-07-18 | End: 2019-07-22 | Stop reason: HOSPADM

## 2019-07-17 RX ORDER — ACETAMINOPHEN 325 MG/1
650 TABLET ORAL EVERY 4 HOURS PRN
Status: DISCONTINUED | OUTPATIENT
Start: 2019-07-17 | End: 2019-07-22 | Stop reason: HOSPADM

## 2019-07-17 RX ADMIN — SODIUM CHLORIDE 1000 ML: 9 INJECTION, SOLUTION INTRAVENOUS at 20:47

## 2019-07-17 RX ADMIN — IOPAMIDOL 75 ML: 612 INJECTION, SOLUTION INTRAVENOUS at 20:15

## 2019-07-17 ASSESSMENT — PAIN DESCRIPTION - PAIN TYPE: TYPE: ACUTE PAIN

## 2019-07-17 ASSESSMENT — PAIN DESCRIPTION - LOCATION: LOCATION: ABDOMEN

## 2019-07-17 ASSESSMENT — PAIN SCALES - GENERAL
PAINLEVEL_OUTOF10: 0
PAINLEVEL_OUTOF10: 7

## 2019-07-18 ENCOUNTER — APPOINTMENT (OUTPATIENT)
Dept: ULTRASOUND IMAGING | Age: 79
DRG: 446 | End: 2019-07-18
Payer: MEDICARE

## 2019-07-18 PROBLEM — K80.12 CALCULUS OF GALLBLADDER WITH ACUTE ON CHRONIC CHOLECYSTITIS WITHOUT OBSTRUCTION: Status: ACTIVE | Noted: 2019-07-18

## 2019-07-18 PROBLEM — R17 JAUNDICE: Status: ACTIVE | Noted: 2019-07-18

## 2019-07-18 LAB
A/G RATIO: 0.8 (ref 1.1–2.2)
ALBUMIN SERPL-MCNC: 3 G/DL (ref 3.4–5)
ALP BLD-CCNC: 197 U/L (ref 40–129)
ALT SERPL-CCNC: 85 U/L (ref 10–40)
ANION GAP SERPL CALCULATED.3IONS-SCNC: 17 MMOL/L (ref 3–16)
AST SERPL-CCNC: 66 U/L (ref 15–37)
BASOPHILS ABSOLUTE: 0 K/UL (ref 0–0.2)
BASOPHILS RELATIVE PERCENT: 0.4 %
BILIRUB SERPL-MCNC: 8.4 MG/DL (ref 0–1)
BUN BLDV-MCNC: 20 MG/DL (ref 7–20)
CALCIUM SERPL-MCNC: 8.5 MG/DL (ref 8.3–10.6)
CHLORIDE BLD-SCNC: 97 MMOL/L (ref 99–110)
CO2: 21 MMOL/L (ref 21–32)
CREAT SERPL-MCNC: 1.2 MG/DL (ref 0.8–1.3)
EKG ATRIAL RATE: 91 BPM
EKG DIAGNOSIS: NORMAL
EKG P AXIS: 51 DEGREES
EKG P-R INTERVAL: 188 MS
EKG Q-T INTERVAL: 394 MS
EKG QRS DURATION: 146 MS
EKG QTC CALCULATION (BAZETT): 484 MS
EKG R AXIS: -44 DEGREES
EKG T AXIS: 26 DEGREES
EKG VENTRICULAR RATE: 91 BPM
EOSINOPHILS ABSOLUTE: 0.1 K/UL (ref 0–0.6)
EOSINOPHILS RELATIVE PERCENT: 1.2 %
GFR AFRICAN AMERICAN: >60
GFR NON-AFRICAN AMERICAN: 59
GLOBULIN: 3.9 G/DL
GLUCOSE BLD-MCNC: 157 MG/DL (ref 70–99)
GLUCOSE BLD-MCNC: 173 MG/DL (ref 70–99)
GLUCOSE BLD-MCNC: 173 MG/DL (ref 70–99)
GLUCOSE BLD-MCNC: 188 MG/DL (ref 70–99)
GLUCOSE BLD-MCNC: 197 MG/DL (ref 70–99)
HAV IGM SER IA-ACNC: NORMAL
HCT VFR BLD CALC: 43.2 % (ref 40.5–52.5)
HEMOGLOBIN: 14.8 G/DL (ref 13.5–17.5)
HEPATITIS B CORE IGM ANTIBODY: NORMAL
HEPATITIS B SURFACE ANTIGEN INTERPRETATION: NORMAL
HEPATITIS C ANTIBODY INTERPRETATION: NORMAL
LACTIC ACID, SEPSIS: 1.5 MMOL/L (ref 0.4–1.9)
LYMPHOCYTES ABSOLUTE: 0.8 K/UL (ref 1–5.1)
LYMPHOCYTES RELATIVE PERCENT: 7.3 %
MCH RBC QN AUTO: 31.3 PG (ref 26–34)
MCHC RBC AUTO-ENTMCNC: 34.3 G/DL (ref 31–36)
MCV RBC AUTO: 91 FL (ref 80–100)
MONOCYTES ABSOLUTE: 1 K/UL (ref 0–1.3)
MONOCYTES RELATIVE PERCENT: 9.2 %
NEUTROPHILS ABSOLUTE: 8.8 K/UL (ref 1.7–7.7)
NEUTROPHILS RELATIVE PERCENT: 81.9 %
PDW BLD-RTO: 15.3 % (ref 12.4–15.4)
PERFORMED ON: ABNORMAL
PLATELET # BLD: 164 K/UL (ref 135–450)
PMV BLD AUTO: 7.6 FL (ref 5–10.5)
POTASSIUM REFLEX MAGNESIUM: 4 MMOL/L (ref 3.5–5.1)
RBC # BLD: 4.75 M/UL (ref 4.2–5.9)
SODIUM BLD-SCNC: 135 MMOL/L (ref 136–145)
TOTAL PROTEIN: 6.9 G/DL (ref 6.4–8.2)
WBC # BLD: 10.7 K/UL (ref 4–11)

## 2019-07-18 PROCEDURE — 99223 1ST HOSP IP/OBS HIGH 75: CPT | Performed by: SURGERY

## 2019-07-18 PROCEDURE — 93010 ELECTROCARDIOGRAM REPORT: CPT | Performed by: INTERNAL MEDICINE

## 2019-07-18 PROCEDURE — 36415 COLL VENOUS BLD VENIPUNCTURE: CPT

## 2019-07-18 PROCEDURE — 6360000002 HC RX W HCPCS: Performed by: INTERNAL MEDICINE

## 2019-07-18 PROCEDURE — 2580000003 HC RX 258: Performed by: INTERNAL MEDICINE

## 2019-07-18 PROCEDURE — 76705 ECHO EXAM OF ABDOMEN: CPT

## 2019-07-18 PROCEDURE — 99233 SBSQ HOSP IP/OBS HIGH 50: CPT | Performed by: INTERNAL MEDICINE

## 2019-07-18 PROCEDURE — 85025 COMPLETE CBC W/AUTO DIFF WBC: CPT

## 2019-07-18 PROCEDURE — 6370000000 HC RX 637 (ALT 250 FOR IP): Performed by: INTERNAL MEDICINE

## 2019-07-18 PROCEDURE — 2060000000 HC ICU INTERMEDIATE R&B

## 2019-07-18 PROCEDURE — 80053 COMPREHEN METABOLIC PANEL: CPT

## 2019-07-18 RX ORDER — SODIUM CHLORIDE 9 MG/ML
INJECTION, SOLUTION INTRAVENOUS
Status: COMPLETED
Start: 2019-07-18 | End: 2019-07-19

## 2019-07-18 RX ADMIN — METOPROLOL TARTRATE 25 MG: 25 TABLET ORAL at 21:14

## 2019-07-18 RX ADMIN — ASPIRIN 81 MG 81 MG: 81 TABLET ORAL at 09:26

## 2019-07-18 RX ADMIN — SIMVASTATIN 40 MG: 40 TABLET, FILM COATED ORAL at 21:14

## 2019-07-18 RX ADMIN — Medication 10 ML: at 00:48

## 2019-07-18 RX ADMIN — SODIUM CHLORIDE, POTASSIUM CHLORIDE, SODIUM LACTATE AND CALCIUM CHLORIDE: 600; 310; 30; 20 INJECTION, SOLUTION INTRAVENOUS at 00:49

## 2019-07-18 RX ADMIN — Medication 10 ML: at 09:27

## 2019-07-18 RX ADMIN — Medication 10 ML: at 21:14

## 2019-07-18 RX ADMIN — MEROPENEM 1 G: 1 INJECTION, POWDER, FOR SOLUTION INTRAVENOUS at 15:58

## 2019-07-18 RX ADMIN — ENOXAPARIN SODIUM 40 MG: 100 INJECTION SUBCUTANEOUS at 09:26

## 2019-07-18 RX ADMIN — MEROPENEM 1 G: 1 INJECTION, POWDER, FOR SOLUTION INTRAVENOUS at 09:26

## 2019-07-18 RX ADMIN — MEROPENEM 1 G: 1 INJECTION, POWDER, FOR SOLUTION INTRAVENOUS at 00:49

## 2019-07-18 RX ADMIN — SODIUM CHLORIDE, POTASSIUM CHLORIDE, SODIUM LACTATE AND CALCIUM CHLORIDE: 600; 310; 30; 20 INJECTION, SOLUTION INTRAVENOUS at 09:31

## 2019-07-18 NOTE — PROGRESS NOTES
Second lactic 1.5. Perfect serve letting  know and he discontinued the next lactic. Admission assessment complete. Pt denies any abdominal pain at this time. call light and bedside table within reach.  Electronically signed by Catalina Snow RN on 7/18/2019 at 1:40 AM

## 2019-07-18 NOTE — ED PROVIDER NOTES
[address] ED  EMERGENCY DEPARTMENT ENCOUNTER        Pt Name: Bayron Zelaya  MRN: [id number]  [place] 1940  Date of evaluation: 7/17/2019  Provider: SHELTON Vora Caro - JENNIFER  PCP: Leonie Catherine MD    This patient was seen and evaluated by the attending physician Keo Montes De Oca, [address] [facility]       Chief Complaint   Patient presents with    Abdominal Pain     pt c/o lower abdominal pain with N/V that started on sunday    Emesis       HISTORY OF PRESENT ILLNESS   (Location/Symptom, Timing/Onset, Context/Setting, Quality, Duration, Modifying Factors, Severity)  Note limiting factors. Bayron Zleaya is a 67 y.o. male who presents for evaluation of abdominal pain and vomiting. Patient states that he developed vomiting on Saturday. States that he has had abdominal pain off and on for the past few months. Saw his primary medical doctor who told him he had a hernia however, no imaging was performed. States that his abdomen is \"rock hard\" at times. States that he is diabetic and is able to eat small amounts of food. Patient states that currently he is not nauseated or having abdominal pain. States that he has felt like he has had a fever and has had chills. Patient denies chest pain, shortness of breath, pain with urination, diarrhea, or constipation. States that he did take Pepto-Bismol yesterday without relief. Nursing Notes were all reviewed and agreed with or any disagreements were addressed  in the HPI. REVIEW OF SYSTEMS    (2-9 systems for level 4, 10 or more for level 5)     Review of Systems    Positives and Pertinent negatives as per HPI. Except as noted abovein the ROS, all other systems were reviewed and negative.        PAST MEDICAL HISTORY     Past Medical History:   Diagnosis Date    Achilles tendon tear     Acid reflux     Anesthesia     woke up during surery and was combative x1, hsd hsf surgery since and done OK    Diabetes mellitus Organs: Cholelithiasis. Gallbladder is distended. Moderate inflammatory stranding is seen about the gallbladder. Enhancement of the wall of the common bile duct. Calcified granulomas within spleen. Stomach is decompressed. No pancreatic ductal dilatation or pancreatic stranding. Adrenal glands are within normal limits. 9.4 x 6.7 cm left renal cyst.  1.4 cm intermediate density lesion along the lateral right kidney, Hounsfield units 48, which had measured 0.9 cm on prior. Cyst along the posterior margin of the right kidney. There is ill-defined fat density induration along the anterior margin of the right kidney with punctate calcifications, similar to prior, compatible with prior partial nephrectomy. Calcification of the abdominal aorta and iliac arteries. GI/Bowel: Diverticulosis, without justin diverticulitis. Appendix is within normal limits in caliber. Loops of small and large bowel are nondilated. Pelvis: Bladder is grossly unremarkable. Calcification of prostate. Prostatomegaly, measuring 5.2 x 4.4 cm. No inguinal adenopathy. Peritoneum/Retroperitoneum: No free air. Bones/Soft Tissues: Degenerative change of the spine. Fat density lesions within left paraspinal musculature and right gluteus musculature, likely lipoma is. Cholelithiasis with gallbladder distension and moderate adjacent inflammatory change, most compatible with cholecystitis. Postoperative change of the right kidney. Bilateral renal cysts are seen. An indeterminate 1.4 cm left renal lesion is seen, larger than the prior examination, which can reflect complex cyst or neoplasm. In the nonacute setting, renal protocol CT or MR is recommended for further characterization. Diverticulosis. Prostatomegaly. Correlate with urologic history.            PROCEDURES   Unless otherwise noted below, none     Procedures    CRITICAL CARE TIME   N/A    CONSULTS:  IP CONSULT TO GENERAL SURGERY      EMERGENCY DEPARTMENT COURSE and DIFFERENTIAL DIAGNOSIS/MDM:   Vitals:    Vitals:    07/17/19 1643 07/17/19 1853 07/17/19 1931 07/17/19 2051   BP: (!) 142/78 132/72 127/63 (!) 144/62   Pulse: 89 86 78 86   Resp: 16 16 16    Temp: 100.3 °F (37.9 °C)      TempSrc: Oral      SpO2: 96% 98% 97% 97%   Weight: 232 lb (105.2 kg)      Height: 5' 10.5\" (1.791 m)          Patient was given thefollowing medications:  Medications   0.9 % sodium chloride bolus (1,000 mLs Intravenous New Bag 7/17/19 2047)   iopamidol (ISOVUE-300) 61 % injection 75 mL (75 mLs Intravenous Given 7/17/19 2015)     Patient is nontoxic, no apparent distress, sitting on the bed. Lab work, urinalysis, and CT of abdomen and pelvis ordered. Troponin negative. Lactic acid slightly elevated at 2, 1 L of normal saline given. CBC with an increased white count, urinalysis has a large amount of bilirubin, alk phos, ALT and AST are elevated as compared to baseline. Total bili elevated at 7.8, INR slightly elevated at 1.27. CT of abdomen and pelvis positive for cholecystitis with moderate inflammatory changes. Patient to be admitted at this time for further work-up of elevated bili, liver enzymes, and cholecystitis. Spoke with the hospitalist, who is aware of patient and agreed to admission. Hospitalist ordered antibiotics prior to the ER order antibiotics. Patient and family updated about admission at this time. No further questions    Differential diagnoses include but are not limited to cholecystitis, diverticulitis, appendicitis, AAA, acute coronary syndrome, small bowel obstruction, UTI, and kidney stone. FINAL IMPRESSION      1. Acute cholecystitis    2. Elevated bilirubin    3.  Elevated liver enzymes          DISPOSITION/PLAN   DISPOSITION Admitted 07/17/2019 09:31:28 PM      PATIENT REFERREDTO:  Colton Hutton MD  [address] [place] [address] Dr. De Los Santos [address] [facility]  [phone]            DISCHARGE MEDICATIONS:  New Prescriptions    No medications on file       DISCONTINUED

## 2019-07-18 NOTE — ED PROVIDER NOTES
[address] ED  EMERGENCY DEPARTMENT ENCOUNTER      Pt Name: Stanley Ramos  MRN: [id number]  [place] 1940  Date of evaluation: 7/17/2019  Provider: Rich Hathaway DO    CHIEF COMPLAINT       Chief Complaint   Patient presents with    Abdominal Pain     pt c/o lower abdominal pain with N/V that started on sunday    Emesis         HISTORY OF PRESENT ILLNESS   (Location/Symptom, Timing/Onset, Context/Setting, Quality, Duration, Modifying Factors, Severity)  Note limiting factors. Stanley Ramos is a 67 y.o. male who presents to the emergency department with complaint of abdominal pain. Patient reports he is been having abdominal pain for the past few months. He saw his primary care doctor who stated that he had a hernia. No imaging was performed. He reports he is continued to have worsening abdominal pain and now associated with nonbloody nonbilious vomiting that started 3 days ago. He denies diarrhea or constipation. Denies history of abdominal surgeries. Reports he used to drink a large amount of alcohol but no longer does. Denies large amount of Tylenol use. Nursing Notes were reviewed.       PAST MEDICAL HISTORY     Past Medical History:   Diagnosis Date    Achilles tendon tear     Acid reflux     Anesthesia     woke up during surery and was combative x1, hsd hsf surgery since and done OK    Diabetes mellitus ([facility] [address]     Heart murmur H/O    Hypercholesteremia     Hypertension     Right renal mass 5/16/2013    solid renal mass needing further work up         SURGICAL HISTORY       Past Surgical History:   Procedure Laterality Date    AORTIC VALVE REPLACEMENT      CARDIAC SURGERY      cardiac stents x2    COLONOSCOPY      ESOPHAGEAL DILATATION      EYE SURGERY Left     EYE SURGERY Right 6/4/13    cataract with lens implant    FOOT SURGERY      wound from piece of glass    KIDNEY BIOPSY  2013    OTHER SURGICAL HISTORY  1/30/15    excision of (2) cysts on the back and Protein,  (*)     Urobilinogen, Urine >=8.0 (*)     Nitrite, Urine POSITIVE (*)     All other components within normal limits    Narrative:     Performed at:  [facility],  [address]   Phone [phone]   LIPASE - Abnormal; Notable for the following components:    Lipase 8.0 (*)     All other components within normal limits    Narrative:     Performed at:  [place] [address],  [employer]   Phone [phone]   MICROSCOPIC URINALYSIS - Abnormal; Notable for the following components:    Mucus, UA 2+ (*)     RBC, UA 10-20 (*)     Bacteria, UA 2+ (*)     All other components within normal limits    Narrative:     Performed at:  [place] [address],  [employer]   Phone [phone]   HEPATIC FUNCTION PANEL - Abnormal; Notable for the following components:    Alkaline Phosphatase 232 (*)      (*)      (*)     Total Bilirubin 7.7 (*)     Bilirubin, Direct 5.4 (*)     Bilirubin, Indirect 2.3 (*)     All other components within normal limits    Narrative:     Performed at:  [place] [address],  [address]   Phone [phone]   ACETAMINOPHEN LEVEL - Abnormal; Notable for the following components:    Acetaminophen Level <5 (*)     All other components within normal limits    Narrative:     Performed at:  [place] [address],  [employer]   Phone [phone] - Abnormal; Notable for the following components:    Protime 14.5 (*)     INR 1.27 (*)     All other components within normal limits    Narrative:     Performed at:  [facility],  [employer]   Phone [phone]   LACTATE, SEPSIS - Abnormal; Notable for the following components:    Lactic Acid, cholecystitis    2. Elevated bilirubin    3.  Elevated liver enzymes          DISPOSITION/PLAN   DISPOSITION Admitted 07/17/2019 09:31:28 PM      PATIENT REFERRED TO:  Milan Wiggins MD  [address] [place] [address] Dr. De Los Santos [address] [place] [address]  [phone]            DISCHARGE MEDICATIONS:  New Prescriptions    No medications on file          (Please note that portions of this note were completed with a voice recognition program.  Efforts were made to edit the dictations but occasionally words are mis-transcribed.)    Joslyn Estrada DO (electronically signed)  Attending Emergency Physician      Joslyn Estrada DO  07/17/19 0585

## 2019-07-19 ENCOUNTER — APPOINTMENT (OUTPATIENT)
Dept: MRI IMAGING | Age: 79
DRG: 446 | End: 2019-07-19
Payer: MEDICARE

## 2019-07-19 LAB
GLUCOSE BLD-MCNC: 151 MG/DL (ref 70–99)
GLUCOSE BLD-MCNC: 161 MG/DL (ref 70–99)
GLUCOSE BLD-MCNC: 180 MG/DL (ref 70–99)
GLUCOSE BLD-MCNC: 201 MG/DL (ref 70–99)
PERFORMED ON: ABNORMAL
URINE CULTURE, ROUTINE: NORMAL

## 2019-07-19 PROCEDURE — 2060000000 HC ICU INTERMEDIATE R&B

## 2019-07-19 PROCEDURE — 6360000002 HC RX W HCPCS: Performed by: INTERNAL MEDICINE

## 2019-07-19 PROCEDURE — 2580000003 HC RX 258

## 2019-07-19 PROCEDURE — 2580000003 HC RX 258: Performed by: INTERNAL MEDICINE

## 2019-07-19 PROCEDURE — 99232 SBSQ HOSP IP/OBS MODERATE 35: CPT | Performed by: SURGERY

## 2019-07-19 PROCEDURE — 6370000000 HC RX 637 (ALT 250 FOR IP): Performed by: INTERNAL MEDICINE

## 2019-07-19 PROCEDURE — 99232 SBSQ HOSP IP/OBS MODERATE 35: CPT | Performed by: INTERNAL MEDICINE

## 2019-07-19 PROCEDURE — 74181 MRI ABDOMEN W/O CONTRAST: CPT

## 2019-07-19 RX ADMIN — METOPROLOL TARTRATE 25 MG: 25 TABLET ORAL at 20:10

## 2019-07-19 RX ADMIN — MEROPENEM 1 G: 1 INJECTION, POWDER, FOR SOLUTION INTRAVENOUS at 16:59

## 2019-07-19 RX ADMIN — SODIUM CHLORIDE, POTASSIUM CHLORIDE, SODIUM LACTATE AND CALCIUM CHLORIDE: 600; 310; 30; 20 INJECTION, SOLUTION INTRAVENOUS at 09:18

## 2019-07-19 RX ADMIN — MEROPENEM 1 G: 1 INJECTION, POWDER, FOR SOLUTION INTRAVENOUS at 00:15

## 2019-07-19 RX ADMIN — Medication 10 ML: at 20:10

## 2019-07-19 RX ADMIN — ASPIRIN 81 MG 81 MG: 81 TABLET ORAL at 09:17

## 2019-07-19 RX ADMIN — SIMVASTATIN 40 MG: 40 TABLET, FILM COATED ORAL at 20:10

## 2019-07-19 RX ADMIN — MEROPENEM 1 G: 1 INJECTION, POWDER, FOR SOLUTION INTRAVENOUS at 09:16

## 2019-07-19 RX ADMIN — ENOXAPARIN SODIUM 40 MG: 100 INJECTION SUBCUTANEOUS at 09:32

## 2019-07-19 RX ADMIN — METOPROLOL TARTRATE 25 MG: 25 TABLET ORAL at 09:17

## 2019-07-19 RX ADMIN — SODIUM CHLORIDE 250 ML: 9 INJECTION, SOLUTION INTRAVENOUS at 00:15

## 2019-07-19 ASSESSMENT — PAIN SCALES - GENERAL
PAINLEVEL_OUTOF10: 0

## 2019-07-20 LAB
A/G RATIO: 0.6 (ref 1.1–2.2)
ALBUMIN SERPL-MCNC: 2.4 G/DL (ref 3.4–5)
ALP BLD-CCNC: 237 U/L (ref 40–129)
ALT SERPL-CCNC: 56 U/L (ref 10–40)
ANION GAP SERPL CALCULATED.3IONS-SCNC: 13 MMOL/L (ref 3–16)
AST SERPL-CCNC: 57 U/L (ref 15–37)
BASOPHILS ABSOLUTE: 0 K/UL (ref 0–0.2)
BASOPHILS RELATIVE PERCENT: 0.6 %
BILIRUB SERPL-MCNC: 6.1 MG/DL (ref 0–1)
BUN BLDV-MCNC: 21 MG/DL (ref 7–20)
CALCIUM SERPL-MCNC: 8.4 MG/DL (ref 8.3–10.6)
CHLORIDE BLD-SCNC: 100 MMOL/L (ref 99–110)
CO2: 21 MMOL/L (ref 21–32)
CREAT SERPL-MCNC: 1.1 MG/DL (ref 0.8–1.3)
EOSINOPHILS ABSOLUTE: 0.4 K/UL (ref 0–0.6)
EOSINOPHILS RELATIVE PERCENT: 7.2 %
GFR AFRICAN AMERICAN: >60
GFR NON-AFRICAN AMERICAN: >60
GLOBULIN: 3.8 G/DL
GLUCOSE BLD-MCNC: 154 MG/DL (ref 70–99)
GLUCOSE BLD-MCNC: 169 MG/DL (ref 70–99)
GLUCOSE BLD-MCNC: 181 MG/DL (ref 70–99)
GLUCOSE BLD-MCNC: 183 MG/DL (ref 70–99)
GLUCOSE BLD-MCNC: 184 MG/DL (ref 70–99)
GLUCOSE BLD-MCNC: 216 MG/DL (ref 70–99)
HCT VFR BLD CALC: 38.3 % (ref 40.5–52.5)
HEMOGLOBIN: 13.1 G/DL (ref 13.5–17.5)
LYMPHOCYTES ABSOLUTE: 0.7 K/UL (ref 1–5.1)
LYMPHOCYTES RELATIVE PERCENT: 11.6 %
MCH RBC QN AUTO: 31.2 PG (ref 26–34)
MCHC RBC AUTO-ENTMCNC: 34.1 G/DL (ref 31–36)
MCV RBC AUTO: 91.7 FL (ref 80–100)
MONOCYTES ABSOLUTE: 0.6 K/UL (ref 0–1.3)
MONOCYTES RELATIVE PERCENT: 10.7 %
NEUTROPHILS ABSOLUTE: 4.1 K/UL (ref 1.7–7.7)
NEUTROPHILS RELATIVE PERCENT: 69.9 %
PDW BLD-RTO: 15.2 % (ref 12.4–15.4)
PERFORMED ON: ABNORMAL
PLATELET # BLD: 180 K/UL (ref 135–450)
PMV BLD AUTO: 7.6 FL (ref 5–10.5)
POTASSIUM REFLEX MAGNESIUM: 3.8 MMOL/L (ref 3.5–5.1)
RBC # BLD: 4.18 M/UL (ref 4.2–5.9)
SODIUM BLD-SCNC: 134 MMOL/L (ref 136–145)
TOTAL PROTEIN: 6.2 G/DL (ref 6.4–8.2)
WBC # BLD: 5.9 K/UL (ref 4–11)

## 2019-07-20 PROCEDURE — 6360000002 HC RX W HCPCS: Performed by: INTERNAL MEDICINE

## 2019-07-20 PROCEDURE — 6370000000 HC RX 637 (ALT 250 FOR IP): Performed by: PHYSICIAN ASSISTANT

## 2019-07-20 PROCEDURE — 2060000000 HC ICU INTERMEDIATE R&B

## 2019-07-20 PROCEDURE — 2580000003 HC RX 258: Performed by: INTERNAL MEDICINE

## 2019-07-20 PROCEDURE — 36415 COLL VENOUS BLD VENIPUNCTURE: CPT

## 2019-07-20 PROCEDURE — 80053 COMPREHEN METABOLIC PANEL: CPT

## 2019-07-20 PROCEDURE — 99232 SBSQ HOSP IP/OBS MODERATE 35: CPT | Performed by: INTERNAL MEDICINE

## 2019-07-20 PROCEDURE — 2580000003 HC RX 258

## 2019-07-20 PROCEDURE — 6370000000 HC RX 637 (ALT 250 FOR IP): Performed by: INTERNAL MEDICINE

## 2019-07-20 PROCEDURE — 99232 SBSQ HOSP IP/OBS MODERATE 35: CPT | Performed by: SURGERY

## 2019-07-20 PROCEDURE — 85025 COMPLETE CBC W/AUTO DIFF WBC: CPT

## 2019-07-20 RX ORDER — NICOTINE POLACRILEX 4 MG
15 LOZENGE BUCCAL PRN
Status: DISCONTINUED | OUTPATIENT
Start: 2019-07-20 | End: 2019-07-22 | Stop reason: HOSPADM

## 2019-07-20 RX ORDER — DEXTROSE MONOHYDRATE 50 MG/ML
100 INJECTION, SOLUTION INTRAVENOUS PRN
Status: DISCONTINUED | OUTPATIENT
Start: 2019-07-20 | End: 2019-07-22 | Stop reason: HOSPADM

## 2019-07-20 RX ORDER — SODIUM CHLORIDE, SODIUM LACTATE, POTASSIUM CHLORIDE, CALCIUM CHLORIDE 600; 310; 30; 20 MG/100ML; MG/100ML; MG/100ML; MG/100ML
INJECTION, SOLUTION INTRAVENOUS
Status: COMPLETED
Start: 2019-07-20 | End: 2019-07-20

## 2019-07-20 RX ORDER — DEXTROSE MONOHYDRATE 25 G/50ML
12.5 INJECTION, SOLUTION INTRAVENOUS PRN
Status: DISCONTINUED | OUTPATIENT
Start: 2019-07-20 | End: 2019-07-22 | Stop reason: HOSPADM

## 2019-07-20 RX ADMIN — INSULIN LISPRO 1 UNITS: 100 INJECTION, SOLUTION INTRAVENOUS; SUBCUTANEOUS at 19:50

## 2019-07-20 RX ADMIN — PIPERACILLIN SODIUM AND TAZOBACTAM SODIUM 3.38 G: 3; .375 INJECTION, POWDER, LYOPHILIZED, FOR SOLUTION INTRAVENOUS at 19:25

## 2019-07-20 RX ADMIN — ASPIRIN 81 MG 81 MG: 81 TABLET ORAL at 08:46

## 2019-07-20 RX ADMIN — MEROPENEM 1 G: 1 INJECTION, POWDER, FOR SOLUTION INTRAVENOUS at 08:46

## 2019-07-20 RX ADMIN — ENOXAPARIN SODIUM 40 MG: 100 INJECTION SUBCUTANEOUS at 08:46

## 2019-07-20 RX ADMIN — MEROPENEM 1 G: 1 INJECTION, POWDER, FOR SOLUTION INTRAVENOUS at 01:06

## 2019-07-20 RX ADMIN — METOPROLOL TARTRATE 25 MG: 25 TABLET ORAL at 08:46

## 2019-07-20 RX ADMIN — INSULIN LISPRO 1 UNITS: 100 INJECTION, SOLUTION INTRAVENOUS; SUBCUTANEOUS at 16:37

## 2019-07-20 RX ADMIN — Medication 10 ML: at 19:50

## 2019-07-20 RX ADMIN — METOPROLOL TARTRATE 25 MG: 25 TABLET ORAL at 19:51

## 2019-07-20 RX ADMIN — PIPERACILLIN SODIUM AND TAZOBACTAM SODIUM 3.38 G: 3; .375 INJECTION, POWDER, LYOPHILIZED, FOR SOLUTION INTRAVENOUS at 11:27

## 2019-07-20 RX ADMIN — INSULIN LISPRO 1 UNITS: 100 INJECTION, SOLUTION INTRAVENOUS; SUBCUTANEOUS at 11:53

## 2019-07-20 RX ADMIN — SODIUM CHLORIDE, POTASSIUM CHLORIDE, SODIUM LACTATE AND CALCIUM CHLORIDE 1000 ML: 600; 310; 30; 20 INJECTION, SOLUTION INTRAVENOUS at 06:59

## 2019-07-20 ASSESSMENT — PAIN SCALES - GENERAL: PAINLEVEL_OUTOF10: 0

## 2019-07-20 NOTE — PLAN OF CARE
Problem: SAFETY  Goal: Free from accidental physical injury  Note:   Free of falls this shift     Problem: PAIN  Goal: Patient's pain/discomfort is manageable  Note:   Denying complaints of pain, nausea or diarrhea this AM     Problem: KNOWLEDGE DEFICIT  Goal: Patient/S.O. demonstrates understanding of disease process, treatment plan, medications, and discharge instructions.   Note:   Denying questions

## 2019-07-20 NOTE — PROGRESS NOTES
Progress Note    Admit Date:  7/17/2019    Subjective:  Mr. Jessica Carbajal reports he is feeling better with the pain medications. No fevers, no N/V    Objective:   Patient Vitals for the past 4 hrs:   BP Temp Temp src Pulse Resp SpO2   07/20/19 0800 133/64 99 °F (37.2 °C) Oral 72 16 95 %            Intake/Output Summary (Last 24 hours) at 7/20/2019 0946  Last data filed at 7/20/2019 9912  Gross per 24 hour   Intake 3434 ml   Output 2325 ml   Net 1109 ml       Physical Exam:    General appearance: alert, appears stated age and cooperative  Head: Normocephalic, without obvious abnormality, atraumatic  Eyes: scleral icterus. PERRL, EOM's intact.   Neck: no adenopathy, no carotid bruit, no JVD, supple, symmetrical, trachea midline and thyroid not enlarged, symmetric, no tenderness/mass/nodules  Lungs: clear to auscultation bilaterally  Heart: regular rate and rhythm, S1, S2 normal, no murmur, click, rub or gallop  Abdomen: soft, RUQ tenderness; bowel sounds normal; no masses,  no organomegaly  Extremities: extremities normal, atraumatic, no cyanosis or edema  Pulses: 2+ and symmetric  Skin: Skin color, texture, turgor normal. No rashes or lesions  Neurologic: Grossly normal    Scheduled Meds:   insulin lispro  0-6 Units Subcutaneous TID WC    insulin lispro  0-3 Units Subcutaneous Nightly    meropenem  1 g Intravenous Q8H    metoprolol tartrate  25 mg Oral BID    aspirin  81 mg Oral Daily    simvastatin  40 mg Oral Nightly    sodium chloride flush  10 mL Intravenous 2 times per day    enoxaparin  40 mg Subcutaneous Daily       Continuous Infusions:   dextrose      IV infusion builder 125 mL/hr at 07/19/19 0918       PRN Meds:  glucose, dextrose, glucagon (rDNA), dextrose, sodium chloride flush, acetaminophen      Data:  CBC:   Recent Labs     07/17/19  1647 07/18/19  0439 07/20/19  0437   WBC 13.7* 10.7 5.9   HGB 15.8 14.8 13.1*   HCT 46.0 43.2 38.3*   MCV 90.3 91.0 91.7    164 180     BMP:   Recent Labs bilirubin    Elevated liver enzymes  Resolved Problems:    * No resolved hospital problems. *      Plan:    Acute cholecystitis  - General surgery consult. - Continue IV antibiotics  - Surgery following    Jaundice and elevated LFTs  - Patient has jaundice.  - MRCP shows choledocholithiasis. GI consult     Diabetes mellitus type 2  - Monitor sugars. - Use sliding scale insulin. Hypertension  - Blood pressure stable  - Continue BB     Restless leg  - On Requip. #Lovenox for DVT prophylaxis. EUGENIA Bond.

## 2019-07-21 LAB
ANION GAP SERPL CALCULATED.3IONS-SCNC: 14 MMOL/L (ref 3–16)
BUN BLDV-MCNC: 18 MG/DL (ref 7–20)
CALCIUM SERPL-MCNC: 9.2 MG/DL (ref 8.3–10.6)
CHLORIDE BLD-SCNC: 104 MMOL/L (ref 99–110)
CO2: 20 MMOL/L (ref 21–32)
CREAT SERPL-MCNC: 1.3 MG/DL (ref 0.8–1.3)
GFR AFRICAN AMERICAN: >60
GFR NON-AFRICAN AMERICAN: 53
GLUCOSE BLD-MCNC: 177 MG/DL (ref 70–99)
GLUCOSE BLD-MCNC: 197 MG/DL (ref 70–99)
GLUCOSE BLD-MCNC: 241 MG/DL (ref 70–99)
GLUCOSE BLD-MCNC: 247 MG/DL (ref 70–99)
GLUCOSE BLD-MCNC: 249 MG/DL (ref 70–99)
MAGNESIUM: 1.5 MG/DL (ref 1.8–2.4)
PERFORMED ON: ABNORMAL
POTASSIUM SERPL-SCNC: 3.8 MMOL/L (ref 3.5–5.1)
SODIUM BLD-SCNC: 138 MMOL/L (ref 136–145)

## 2019-07-21 PROCEDURE — 6370000000 HC RX 637 (ALT 250 FOR IP): Performed by: PHYSICIAN ASSISTANT

## 2019-07-21 PROCEDURE — 6370000000 HC RX 637 (ALT 250 FOR IP): Performed by: INTERNAL MEDICINE

## 2019-07-21 PROCEDURE — 1200000000 HC SEMI PRIVATE

## 2019-07-21 PROCEDURE — 6360000002 HC RX W HCPCS: Performed by: INTERNAL MEDICINE

## 2019-07-21 PROCEDURE — 6360000002 HC RX W HCPCS: Performed by: PHYSICIAN ASSISTANT

## 2019-07-21 PROCEDURE — 99232 SBSQ HOSP IP/OBS MODERATE 35: CPT | Performed by: SURGERY

## 2019-07-21 PROCEDURE — 2580000003 HC RX 258: Performed by: INTERNAL MEDICINE

## 2019-07-21 PROCEDURE — 80048 BASIC METABOLIC PNL TOTAL CA: CPT

## 2019-07-21 PROCEDURE — 83735 ASSAY OF MAGNESIUM: CPT

## 2019-07-21 PROCEDURE — 36415 COLL VENOUS BLD VENIPUNCTURE: CPT

## 2019-07-21 PROCEDURE — 99232 SBSQ HOSP IP/OBS MODERATE 35: CPT | Performed by: INTERNAL MEDICINE

## 2019-07-21 RX ORDER — CALCIUM CARBONATE 200(500)MG
500 TABLET,CHEWABLE ORAL 3 TIMES DAILY PRN
Status: DISCONTINUED | OUTPATIENT
Start: 2019-07-21 | End: 2019-07-22 | Stop reason: HOSPADM

## 2019-07-21 RX ORDER — MAGNESIUM SULFATE IN WATER 40 MG/ML
2 INJECTION, SOLUTION INTRAVENOUS ONCE
Status: COMPLETED | OUTPATIENT
Start: 2019-07-21 | End: 2019-07-21

## 2019-07-21 RX ADMIN — METOPROLOL TARTRATE 25 MG: 25 TABLET ORAL at 20:51

## 2019-07-21 RX ADMIN — INSULIN LISPRO 2 UNITS: 100 INJECTION, SOLUTION INTRAVENOUS; SUBCUTANEOUS at 11:39

## 2019-07-21 RX ADMIN — ENOXAPARIN SODIUM 40 MG: 100 INJECTION SUBCUTANEOUS at 08:43

## 2019-07-21 RX ADMIN — ANTACID TABLETS 500 MG: 500 TABLET, CHEWABLE ORAL at 01:34

## 2019-07-21 RX ADMIN — Medication 10 ML: at 20:52

## 2019-07-21 RX ADMIN — MAGNESIUM SULFATE HEPTAHYDRATE 2 G: 40 INJECTION, SOLUTION INTRAVENOUS at 08:41

## 2019-07-21 RX ADMIN — INSULIN LISPRO 1 UNITS: 100 INJECTION, SOLUTION INTRAVENOUS; SUBCUTANEOUS at 08:54

## 2019-07-21 RX ADMIN — PIPERACILLIN SODIUM AND TAZOBACTAM SODIUM 3.38 G: 3; .375 INJECTION, POWDER, LYOPHILIZED, FOR SOLUTION INTRAVENOUS at 01:34

## 2019-07-21 RX ADMIN — INSULIN LISPRO 1 UNITS: 100 INJECTION, SOLUTION INTRAVENOUS; SUBCUTANEOUS at 20:54

## 2019-07-21 RX ADMIN — PIPERACILLIN SODIUM AND TAZOBACTAM SODIUM 3.38 G: 3; .375 INJECTION, POWDER, LYOPHILIZED, FOR SOLUTION INTRAVENOUS at 19:41

## 2019-07-21 RX ADMIN — ASPIRIN 81 MG 81 MG: 81 TABLET ORAL at 08:43

## 2019-07-21 RX ADMIN — PIPERACILLIN SODIUM AND TAZOBACTAM SODIUM 3.38 G: 3; .375 INJECTION, POWDER, LYOPHILIZED, FOR SOLUTION INTRAVENOUS at 11:34

## 2019-07-21 RX ADMIN — METOPROLOL TARTRATE 25 MG: 25 TABLET ORAL at 08:43

## 2019-07-21 RX ADMIN — INSULIN LISPRO 2 UNITS: 100 INJECTION, SOLUTION INTRAVENOUS; SUBCUTANEOUS at 17:44

## 2019-07-21 NOTE — PROGRESS NOTES
sliding scale insulin. Hypertension  - Blood pressure stable  - Continue BB     Restless leg  - On Requip. #Lovenox for DVT prophylaxis. EUGENIA Bond.

## 2019-07-22 ENCOUNTER — ANESTHESIA (OUTPATIENT)
Dept: ENDOSCOPY | Age: 79
DRG: 446 | End: 2019-07-22
Payer: MEDICARE

## 2019-07-22 ENCOUNTER — ANESTHESIA EVENT (OUTPATIENT)
Dept: ENDOSCOPY | Age: 79
DRG: 446 | End: 2019-07-22
Payer: MEDICARE

## 2019-07-22 ENCOUNTER — APPOINTMENT (OUTPATIENT)
Dept: GENERAL RADIOLOGY | Age: 79
DRG: 446 | End: 2019-07-22
Payer: MEDICARE

## 2019-07-22 VITALS
BODY MASS INDEX: 31.51 KG/M2 | TEMPERATURE: 97.2 F | SYSTOLIC BLOOD PRESSURE: 174 MMHG | HEIGHT: 71 IN | OXYGEN SATURATION: 97 % | HEART RATE: 72 BPM | WEIGHT: 225.1 LBS | RESPIRATION RATE: 18 BRPM | DIASTOLIC BLOOD PRESSURE: 86 MMHG

## 2019-07-22 VITALS
OXYGEN SATURATION: 89 % | SYSTOLIC BLOOD PRESSURE: 115 MMHG | DIASTOLIC BLOOD PRESSURE: 74 MMHG | RESPIRATION RATE: 5 BRPM

## 2019-07-22 LAB
A/G RATIO: 0.7 (ref 1.1–2.2)
ALBUMIN SERPL-MCNC: 2.9 G/DL (ref 3.4–5)
ALP BLD-CCNC: 427 U/L (ref 40–129)
ALT SERPL-CCNC: 102 U/L (ref 10–40)
ANION GAP SERPL CALCULATED.3IONS-SCNC: 16 MMOL/L (ref 3–16)
AST SERPL-CCNC: 96 U/L (ref 15–37)
BILIRUB SERPL-MCNC: 4 MG/DL (ref 0–1)
BLOOD CULTURE, ROUTINE: NORMAL
BUN BLDV-MCNC: 18 MG/DL (ref 7–20)
CALCIUM SERPL-MCNC: 9 MG/DL (ref 8.3–10.6)
CHLORIDE BLD-SCNC: 100 MMOL/L (ref 99–110)
CO2: 19 MMOL/L (ref 21–32)
CREAT SERPL-MCNC: 1.2 MG/DL (ref 0.8–1.3)
CULTURE, BLOOD 2: NORMAL
GFR AFRICAN AMERICAN: >60
GFR NON-AFRICAN AMERICAN: 59
GLOBULIN: 3.9 G/DL
GLUCOSE BLD-MCNC: 170 MG/DL (ref 70–99)
GLUCOSE BLD-MCNC: 214 MG/DL (ref 70–99)
GLUCOSE BLD-MCNC: 225 MG/DL (ref 70–99)
HCT VFR BLD CALC: 40.6 % (ref 40.5–52.5)
HEMOGLOBIN: 13.8 G/DL (ref 13.5–17.5)
INR BLD: 1.18 (ref 0.86–1.14)
MCH RBC QN AUTO: 30.8 PG (ref 26–34)
MCHC RBC AUTO-ENTMCNC: 34 G/DL (ref 31–36)
MCV RBC AUTO: 90.6 FL (ref 80–100)
PDW BLD-RTO: 15.5 % (ref 12.4–15.4)
PERFORMED ON: ABNORMAL
PERFORMED ON: ABNORMAL
PLATELET # BLD: 231 K/UL (ref 135–450)
PMV BLD AUTO: 7.4 FL (ref 5–10.5)
POTASSIUM SERPL-SCNC: 3.9 MMOL/L (ref 3.5–5.1)
PROTHROMBIN TIME: 13.4 SEC (ref 9.8–13)
RBC # BLD: 4.48 M/UL (ref 4.2–5.9)
SODIUM BLD-SCNC: 135 MMOL/L (ref 136–145)
TOTAL PROTEIN: 6.8 G/DL (ref 6.4–8.2)
WBC # BLD: 6.2 K/UL (ref 4–11)

## 2019-07-22 PROCEDURE — 83036 HEMOGLOBIN GLYCOSYLATED A1C: CPT

## 2019-07-22 PROCEDURE — 3609014900 HC ERCP W/SPHINCTEROTOMY &/OR PAPILLOTOMY: Performed by: INTERNAL MEDICINE

## 2019-07-22 PROCEDURE — 3609015200 HC ERCP REMOVE CALCULI/DEBRIS BILIARY/PANCREAS DUCT: Performed by: INTERNAL MEDICINE

## 2019-07-22 PROCEDURE — 80053 COMPREHEN METABOLIC PANEL: CPT

## 2019-07-22 PROCEDURE — 2709999900 HC NON-CHARGEABLE SUPPLY: Performed by: INTERNAL MEDICINE

## 2019-07-22 PROCEDURE — 74330 X-RAY BILE/PANC ENDOSCOPY: CPT

## 2019-07-22 PROCEDURE — 3700000001 HC ADD 15 MINUTES (ANESTHESIA): Performed by: INTERNAL MEDICINE

## 2019-07-22 PROCEDURE — 7100000011 HC PHASE II RECOVERY - ADDTL 15 MIN: Performed by: INTERNAL MEDICINE

## 2019-07-22 PROCEDURE — 6370000000 HC RX 637 (ALT 250 FOR IP): Performed by: PHYSICIAN ASSISTANT

## 2019-07-22 PROCEDURE — 2720000010 HC SURG SUPPLY STERILE: Performed by: INTERNAL MEDICINE

## 2019-07-22 PROCEDURE — 99232 SBSQ HOSP IP/OBS MODERATE 35: CPT | Performed by: SURGERY

## 2019-07-22 PROCEDURE — 85610 PROTHROMBIN TIME: CPT

## 2019-07-22 PROCEDURE — BF121ZZ FLUOROSCOPY OF GALLBLADDER USING LOW OSMOLAR CONTRAST: ICD-10-PCS | Performed by: INTERNAL MEDICINE

## 2019-07-22 PROCEDURE — 99238 HOSP IP/OBS DSCHRG MGMT 30/<: CPT | Performed by: INTERNAL MEDICINE

## 2019-07-22 PROCEDURE — 36415 COLL VENOUS BLD VENIPUNCTURE: CPT

## 2019-07-22 PROCEDURE — 6360000002 HC RX W HCPCS: Performed by: NURSE ANESTHETIST, CERTIFIED REGISTERED

## 2019-07-22 PROCEDURE — 7100000010 HC PHASE II RECOVERY - FIRST 15 MIN: Performed by: INTERNAL MEDICINE

## 2019-07-22 PROCEDURE — 88305 TISSUE EXAM BY PATHOLOGIST: CPT

## 2019-07-22 PROCEDURE — 2580000003 HC RX 258: Performed by: NURSE ANESTHETIST, CERTIFIED REGISTERED

## 2019-07-22 PROCEDURE — 6370000000 HC RX 637 (ALT 250 FOR IP): Performed by: INTERNAL MEDICINE

## 2019-07-22 PROCEDURE — 6360000002 HC RX W HCPCS: Performed by: INTERNAL MEDICINE

## 2019-07-22 PROCEDURE — 85027 COMPLETE CBC AUTOMATED: CPT

## 2019-07-22 PROCEDURE — C1769 GUIDE WIRE: HCPCS | Performed by: INTERNAL MEDICINE

## 2019-07-22 PROCEDURE — 0F798ZZ DILATION OF COMMON BILE DUCT, VIA NATURAL OR ARTIFICIAL OPENING ENDOSCOPIC: ICD-10-PCS | Performed by: INTERNAL MEDICINE

## 2019-07-22 PROCEDURE — 3700000000 HC ANESTHESIA ATTENDED CARE: Performed by: INTERNAL MEDICINE

## 2019-07-22 PROCEDURE — 88112 CYTOPATH CELL ENHANCE TECH: CPT

## 2019-07-22 PROCEDURE — 2500000003 HC RX 250 WO HCPCS: Performed by: NURSE ANESTHETIST, CERTIFIED REGISTERED

## 2019-07-22 PROCEDURE — 2580000003 HC RX 258: Performed by: INTERNAL MEDICINE

## 2019-07-22 RX ORDER — PROMETHAZINE HYDROCHLORIDE 25 MG/ML
6.25 INJECTION, SOLUTION INTRAMUSCULAR; INTRAVENOUS
Status: DISCONTINUED | OUTPATIENT
Start: 2019-07-22 | End: 2019-07-22 | Stop reason: HOSPADM

## 2019-07-22 RX ORDER — SIMVASTATIN 40 MG
40 TABLET ORAL NIGHTLY
Qty: 30 TABLET | Refills: 3
Start: 2019-07-22

## 2019-07-22 RX ORDER — PROPOFOL 10 MG/ML
INJECTION, EMULSION INTRAVENOUS PRN
Status: DISCONTINUED | OUTPATIENT
Start: 2019-07-22 | End: 2019-07-22 | Stop reason: SDUPTHER

## 2019-07-22 RX ORDER — ROCURONIUM BROMIDE 10 MG/ML
INJECTION, SOLUTION INTRAVENOUS PRN
Status: DISCONTINUED | OUTPATIENT
Start: 2019-07-22 | End: 2019-07-22 | Stop reason: SDUPTHER

## 2019-07-22 RX ORDER — FENTANYL CITRATE 50 UG/ML
25 INJECTION, SOLUTION INTRAMUSCULAR; INTRAVENOUS EVERY 5 MIN PRN
Status: DISCONTINUED | OUTPATIENT
Start: 2019-07-22 | End: 2019-07-22 | Stop reason: HOSPADM

## 2019-07-22 RX ORDER — GLYCOPYRROLATE 0.2 MG/ML
INJECTION INTRAMUSCULAR; INTRAVENOUS PRN
Status: DISCONTINUED | OUTPATIENT
Start: 2019-07-22 | End: 2019-07-22 | Stop reason: SDUPTHER

## 2019-07-22 RX ORDER — SODIUM CHLORIDE, SODIUM LACTATE, POTASSIUM CHLORIDE, CALCIUM CHLORIDE 600; 310; 30; 20 MG/100ML; MG/100ML; MG/100ML; MG/100ML
INJECTION, SOLUTION INTRAVENOUS CONTINUOUS PRN
Status: DISCONTINUED | OUTPATIENT
Start: 2019-07-22 | End: 2019-07-22 | Stop reason: SDUPTHER

## 2019-07-22 RX ORDER — ONDANSETRON 2 MG/ML
4 INJECTION INTRAMUSCULAR; INTRAVENOUS
Status: DISCONTINUED | OUTPATIENT
Start: 2019-07-22 | End: 2019-07-22 | Stop reason: HOSPADM

## 2019-07-22 RX ORDER — PROPOFOL 10 MG/ML
INJECTION, EMULSION INTRAVENOUS CONTINUOUS PRN
Status: DISCONTINUED | OUTPATIENT
Start: 2019-07-22 | End: 2019-07-22

## 2019-07-22 RX ORDER — MEPERIDINE HYDROCHLORIDE 50 MG/ML
12.5 INJECTION INTRAMUSCULAR; INTRAVENOUS; SUBCUTANEOUS EVERY 5 MIN PRN
Status: DISCONTINUED | OUTPATIENT
Start: 2019-07-22 | End: 2019-07-22

## 2019-07-22 RX ORDER — OXYCODONE HYDROCHLORIDE AND ACETAMINOPHEN 5; 325 MG/1; MG/1
1 TABLET ORAL PRN
Status: DISCONTINUED | OUTPATIENT
Start: 2019-07-22 | End: 2019-07-22 | Stop reason: HOSPADM

## 2019-07-22 RX ORDER — HYDRALAZINE HYDROCHLORIDE 20 MG/ML
5 INJECTION INTRAMUSCULAR; INTRAVENOUS EVERY 10 MIN PRN
Status: DISCONTINUED | OUTPATIENT
Start: 2019-07-22 | End: 2019-07-22 | Stop reason: HOSPADM

## 2019-07-22 RX ORDER — AMOXICILLIN AND CLAVULANATE POTASSIUM 500; 125 MG/1; MG/1
1 TABLET, FILM COATED ORAL 3 TIMES DAILY
Qty: 12 TABLET | Refills: 0 | Status: SHIPPED | OUTPATIENT
Start: 2019-07-22 | End: 2019-07-26

## 2019-07-22 RX ORDER — FENTANYL CITRATE 50 UG/ML
INJECTION, SOLUTION INTRAMUSCULAR; INTRAVENOUS PRN
Status: DISCONTINUED | OUTPATIENT
Start: 2019-07-22 | End: 2019-07-22 | Stop reason: SDUPTHER

## 2019-07-22 RX ORDER — OXYCODONE HYDROCHLORIDE AND ACETAMINOPHEN 5; 325 MG/1; MG/1
2 TABLET ORAL PRN
Status: DISCONTINUED | OUTPATIENT
Start: 2019-07-22 | End: 2019-07-22 | Stop reason: HOSPADM

## 2019-07-22 RX ADMIN — FENTANYL CITRATE 100 MCG: 50 INJECTION INTRAMUSCULAR; INTRAVENOUS at 12:15

## 2019-07-22 RX ADMIN — SUGAMMADEX 200 MG: 100 INJECTION, SOLUTION INTRAVENOUS at 13:00

## 2019-07-22 RX ADMIN — INSULIN LISPRO 4 UNITS: 100 INJECTION, SOLUTION INTRAVENOUS; SUBCUTANEOUS at 16:53

## 2019-07-22 RX ADMIN — PIPERACILLIN SODIUM AND TAZOBACTAM SODIUM 3.38 G: 3; .375 INJECTION, POWDER, LYOPHILIZED, FOR SOLUTION INTRAVENOUS at 14:24

## 2019-07-22 RX ADMIN — ASPIRIN 81 MG 81 MG: 81 TABLET ORAL at 14:23

## 2019-07-22 RX ADMIN — METOPROLOL TARTRATE 25 MG: 25 TABLET ORAL at 14:23

## 2019-07-22 RX ADMIN — ROCURONIUM BROMIDE 50 MG: 10 INJECTION, SOLUTION INTRAVENOUS at 12:16

## 2019-07-22 RX ADMIN — PROPOFOL 130 MG: 10 INJECTION, EMULSION INTRAVENOUS at 12:16

## 2019-07-22 RX ADMIN — PIPERACILLIN SODIUM AND TAZOBACTAM SODIUM 3.38 G: 3; .375 INJECTION, POWDER, LYOPHILIZED, FOR SOLUTION INTRAVENOUS at 02:29

## 2019-07-22 RX ADMIN — INSULIN LISPRO 1 UNITS: 100 INJECTION, SOLUTION INTRAVENOUS; SUBCUTANEOUS at 09:16

## 2019-07-22 RX ADMIN — GLYCOPYRROLATE 0.2 MG: 0.2 INJECTION, SOLUTION INTRAMUSCULAR; INTRAVENOUS at 12:15

## 2019-07-22 RX ADMIN — SODIUM CHLORIDE, POTASSIUM CHLORIDE, SODIUM LACTATE AND CALCIUM CHLORIDE: 600; 310; 30; 20 INJECTION, SOLUTION INTRAVENOUS at 12:09

## 2019-07-22 ASSESSMENT — PULMONARY FUNCTION TESTS
PIF_VALUE: 12
PIF_VALUE: 27
PIF_VALUE: 3
PIF_VALUE: 25
PIF_VALUE: 25
PIF_VALUE: 1
PIF_VALUE: 24
PIF_VALUE: 25
PIF_VALUE: 0
PIF_VALUE: 26
PIF_VALUE: 25
PIF_VALUE: 25
PIF_VALUE: 20
PIF_VALUE: 0
PIF_VALUE: 3
PIF_VALUE: 25
PIF_VALUE: 3
PIF_VALUE: 22
PIF_VALUE: 25
PIF_VALUE: 29
PIF_VALUE: 26
PIF_VALUE: 25
PIF_VALUE: 0
PIF_VALUE: 27
PIF_VALUE: 24
PIF_VALUE: 25
PIF_VALUE: 24
PIF_VALUE: 4
PIF_VALUE: 2
PIF_VALUE: 25
PIF_VALUE: 25
PIF_VALUE: 2
PIF_VALUE: 22
PIF_VALUE: 0
PIF_VALUE: 2
PIF_VALUE: 19
PIF_VALUE: 1
PIF_VALUE: 2
PIF_VALUE: 25
PIF_VALUE: 19
PIF_VALUE: 2
PIF_VALUE: 26
PIF_VALUE: 23
PIF_VALUE: 26
PIF_VALUE: 25
PIF_VALUE: 28
PIF_VALUE: 4
PIF_VALUE: 1
PIF_VALUE: 28
PIF_VALUE: 26
PIF_VALUE: 24
PIF_VALUE: 0
PIF_VALUE: 26
PIF_VALUE: 3
PIF_VALUE: 0
PIF_VALUE: 0
PIF_VALUE: 29
PIF_VALUE: 1
PIF_VALUE: 26

## 2019-07-22 ASSESSMENT — PAIN SCALES - GENERAL
PAINLEVEL_OUTOF10: 0

## 2019-07-22 ASSESSMENT — PAIN - FUNCTIONAL ASSESSMENT: PAIN_FUNCTIONAL_ASSESSMENT: 0-10

## 2019-07-22 NOTE — PROGRESS NOTES
AM assessment completed. NPO for ERCP this morning. VSS on room air. A/O x4. Denies any needs, call light in reach. Will monitor.

## 2019-07-22 NOTE — ANESTHESIA PRE PROCEDURE
Department of Anesthesiology  Preprocedure Note       Name:  Jose Goodwin   Age:  67 y.o.  :  1940                                          MRN:  [id number]         Date:  2019      Surgeon:  Heena Morris DO    Procedure: ERCP WF W/NEGRITA. (N/A )    HPI:  The patient is a 67 y.o. male with significant past medical history of DM, HTN and renal mass who presents with a couple month h/o RUQ pain, nausea, and emesis found to be related to cholecystitis and choledocholithiasis. CT demonstrated cholecystitis and cholelithiasis. MRCP also demonstrated a distal CBD filling defect. Medications prior to admission:    ROPINIRole (REQUIP) 0.5 MG tablet Take 1 tablet by mouth nightly 1-2 hours before bedtime   ibuprofen (ADVIL;MOTRIN) 800 MG  Take 1 tablet by mouth every 8 hours as needed for Pain   omeprazole (PRILOSEC) 20 MG  Take 40 mg by mouth daily    simvastatin (ZOCOR) 40 MG Take 40 mg by mouth nightly   insulin detemir (LEVEMIR) 1 vial Inject 55 Units into the skin 2 times daily    glipiZIDE (GLUCOTROL) 10 MG  Take 10 mg by mouth 2 times daily (before meals)    metoprolol (LOPRESSOR) 25 MG  Take by mouth nightly Indications: Take DOS    aspirin 81 MG chewable  Take 81 mg by mouth daily.  Indications: stopped 2015     Current medications:     calcium carbonate (TUMS) chewable  500 mg Oral TID PRN    insulin lispro (HUMALOG)   0-6 Units Subcutaneous TID WC    insulin lispro (HUMALOG) injection  0-3 Units Subcutaneous Nightly    glucose (GLUTOSE) 40 % oral ge  15 g Oral PRN    dextrose 50 % IV solution  12.5 g Intravenous PRN    glucagon (rDNA) injection 1 mg  1 mg Intramuscular PRN    piperacillin-tazobactam (ZOSYN)   3.375 g Intravenous Q8H    metoprolol tartrate (LOPRESSOR)  25 mg Oral BID    aspirin chewable tablet 81 mg  81 mg Oral Daily    enoxaparin (LOVENOX)  40 mg Subcutaneous Daily    acetaminophen (TYLENOL)  650 mg Oral Q4H PRN     Allergies:  No Known Allergies    Problem

## 2019-07-22 NOTE — H&P
PRN, Ghazal Fiore PA-C    piperacillin-tazobactam (ZOSYN) 3.375 g in sodium chloride 0.9 % 100 mL IVPB extended infusion (mini-bag), 3.375 g, Intravenous, Q8H, Kendell Hagen MD, Stopped at 07/22/19 0629    metoprolol tartrate (LOPRESSOR) tablet 25 mg, 25 mg, Oral, BID, Mariella Coombs MD, 25 mg at 07/21/19 2051    aspirin chewable tablet 81 mg, 81 mg, Oral, Daily, Mariella Coombs MD, 81 mg at 07/21/19 0843    sodium chloride flush 0.9 % injection 10 mL, 10 mL, Intravenous, 2 times per day, Mariella Coombs MD, 10 mL at 07/21/19 2052    sodium chloride flush 0.9 % injection 10 mL, 10 mL, Intravenous, PRN, Mariella Coombs MD    enoxaparin (LOVENOX) injection 40 mg, 40 mg, Subcutaneous, Daily, Hany Alberto MD, Stopped at 07/22/19 0740    acetaminophen (TYLENOL) tablet 650 mg, 650 mg, Oral, Q4H PRN, Mariella Coombs MD      Infusions:    dextrose       PRN Medications: calcium carbonate, glucose, dextrose, glucagon (rDNA), dextrose, sodium chloride flush, acetaminophen  Allergies: No Known Allergies      Objective:     Physical Exam:   /68   Pulse 65   Temp 97 °F (36.1 °C) (Temporal)   Resp 16   Ht 5' 10.5\" (1.791 m)   Wt 225 lb 1.6 oz (102.1 kg)   SpO2 97%   BMI 31.84 kg/m²     HEENT: NCAT  Lungs: CTAB  CV: RRR  Abd: soft, ntd  Ext: dpi    Lab and Imaging Review   Labs:  CBC:   Recent Labs     07/20/19  0437 07/22/19  0508   WBC 5.9 6.2   HGB 13.1* 13.8   HCT 38.3* 40.6   MCV 91.7 90.6    231     BMP:   Recent Labs     07/20/19  0436 07/21/19  0640 07/22/19  0508   * 138 135*   K 3.8 3.8 3.9    104 100   CO2 21 20* 19*   BUN 21* 18 18   CREATININE 1.1 1.3 1.2     LIVER PROFILE:   Recent Labs     07/20/19  0436 07/22/19  0508   AST 57* 96*   ALT 56* 102*   PROT 6.2* 6.8   BILITOT 6.1* 4.0*   ALKPHOS 237* 427*     PT/INR:   Recent Labs     07/22/19  0508   INR 1.18*       Pre-Procedure Assessment / Plan:  ASA: Class 3 - A patient with severe systemic
Urinalysis:   Lab Results   Component Value Date    NITRU POSITIVE 07/17/2019    WBCUA 0-2 07/17/2019    BACTERIA 2+ 07/17/2019    RBCUA 10-20 07/17/2019    BLOODU MODERATE 07/17/2019    SPECGRAV 1.025 07/17/2019    GLUCOSEU 250 07/17/2019       Radiology:   EKG:  I have reviewed the EKG with the following interpretation: EKG was pending    CT ABDOMEN PELVIS W IV CONTRAST Additional Contrast? None   Final Result   Cholelithiasis with gallbladder distension and moderate adjacent inflammatory   change, most compatible with cholecystitis. Postoperative change of the right kidney. Bilateral renal cysts are seen. An indeterminate 1.4 cm left renal lesion is seen, larger than the prior   examination, which can reflect complex cyst or neoplasm. In the nonacute   setting, renal protocol CT or MR is recommended for further characterization. Diverticulosis. Prostatomegaly. Correlate with urologic history. ASSESSMENT:  Acute cholecystitis  Abdominal pain  Hyperbilirubinemia   Transaminitis   Nausea and vomiting  CAD  Diabetes mellitus type 2  Left renal lesion, cannot rule out neoplasm  Obesity Body mass index is 31.43 kg/m². Hypertension  Status post AVR    PLAN:  Keep patient n.p.o.  IV meropenem, obtain blood cultures  Get her right upper quadrant ultrasound, may need an MRCP  Consult general surgery, no signs of ductal dilatation to suggest an obstructive process  Sliding scale insulin  Continue beta-blockers  IV fluids with LR      DVT Prophylaxis: lovenox  Diet: Diet NPO Effective Now  Code Status: Full Code    Dispo -admit      Thank you for the opportunity to be involved in this patient's care.       (Please note that portions of this note were completed with a voice recognition program. Efforts were made to edit the dictations but occasionally words are mis-transcribed.)

## 2019-07-22 NOTE — PROGRESS NOTES
Progress Note    Admit Date:  7/17/2019    Subjective:  Mr. Cordelia He reports he is feeling well today. Only has pain with palpation to the RUQ. Has been NPO since midnight for ERCP today, but has been tolerating PO well. Objective:   Patient Vitals for the past 4 hrs:   BP Temp Temp src Pulse Resp SpO2   07/22/19 0815 135/67 97.2 °F (36.2 °C) Oral 64 18 95 %        Intake/Output Summary (Last 24 hours) at 7/22/2019 1020  Last data filed at 7/22/2019 0518  Gross per 24 hour   Intake 937 ml   Output 2750 ml   Net -1813 ml       Physical Exam:    General appearance: alert, appears stated age and cooperative  Head: Normocephalic, without obvious abnormality, atraumatic  Eyes: scleral icterus. PERRL, EOM's intact. Neck: no adenopathy, no carotid bruit, no JVD, supple, symmetrical, trachea midline and thyroid not enlarged, symmetric, no tenderness/mass/nodules  Lungs: clear to auscultation bilaterally  Heart: regular rate and rhythm, S1, S2 normal, no murmur, click, rub or gallop  Abdomen: soft, RUQ tenderness; bowel sounds normal; no masses,  no organomegaly  Extremities: extremities normal, atraumatic, no cyanosis or edema  Pulses: 2+ and symmetric  Skin: Skin color, texture, turgor normal. No rashes or lesions  Neurologic: Grossly normal      EMY MARTINEZ have reviewed the chart on [facility] and personally interviewed and examined patient, reviewed the data (labs and imaging) and after discussion with my PA formulated the plan. Agree with note with the following edits. Physical exam:    /67   Pulse 64   Temp 97.2 °F (36.2 °C) (Oral)   Resp 18   Ht 5' 10.5\" (1.791 m)   Wt 225 lb 1.6 oz (102.1 kg)   SpO2 95%   BMI 31.84 kg/m²     Gen: No distress. Alert. Eyes: PERRL. sclera icterus. No conjunctival injection. ENT: No discharge. Pharynx clear. Neck: Trachea midline. Normal thyroid. Resp: No accessory muscle use. No crackles. No wheezes. No rhonchi.  No dullness on percussion. CV: Regular rate. Regular rhythm. No murmur or rub. No edema. GI: Non-tender. Non-distended. No masses. No organomegaly. Normal bowel sounds. No hernia. Skin: Warm and dry. No nodule on exposed extremities. No rash on exposed extremities. Lymph: No cervical LAD. No supraclavicular LAD. M/S: No cyanosis. No joint deformity. No clubbing. Neuro: Awake. Reflexes 2+ symmetric bilaterally. Moves all 4 extremities, non focal  Psych: Oriented x 3. No anxiety or agitation. J CARLOS Bond      Scheduled Meds:   insulin lispro  0-6 Units Subcutaneous TID WC    insulin lispro  0-3 Units Subcutaneous Nightly    piperacillin-tazobactam  3.375 g Intravenous Q8H    metoprolol tartrate  25 mg Oral BID    aspirin  81 mg Oral Daily    sodium chloride flush  10 mL Intravenous 2 times per day    enoxaparin  40 mg Subcutaneous Daily       Continuous Infusions:   dextrose         PRN Meds:  calcium carbonate, glucose, dextrose, glucagon (rDNA), dextrose, sodium chloride flush, acetaminophen      Data:  CBC:   Recent Labs     07/20/19  0437 07/22/19  0508   WBC 5.9 6.2   HGB 13.1* 13.8   HCT 38.3* 40.6   MCV 91.7 90.6    231     BMP:   Recent Labs     07/20/19  0436 07/21/19  0640 07/22/19  0508   * 138 135*   K 3.8 3.8 3.9    104 100   CO2 21 20* 19*   BUN 21* 18 18   CREATININE 1.1 1.3 1.2     LIVER PROFILE:   Recent Labs     07/20/19  0436 07/22/19  0508   AST 57* 96*   ALT 56* 102*   BILITOT 6.1* 4.0*   ALKPHOS 237* 427*     PT/INR:   Recent Labs     07/22/19  0508   PROTIME 13.4*   INR 1.18*     Cultures: pending    MRI ABDOMEN WO CONTRAST MRCP   Final Result   1. Unchanged acute cholecystitis with a filling defect in the distal common   bile duct favoring a choledocholithiasis   2. Unchanged 1.2 cm left Bosniak type 2F renal cyst.  Recommend nonemergent   MRI of the abdomen with without contrast using renal mass protocol. US GALLBLADDER RUQ   Final Result   1. Findings of acute cholecystitis, similar to the recent CT. Associated   subcentimeter gallstones, sludge and gallbladder wall thickening. 2. No biliary dilation. CT ABDOMEN PELVIS W IV CONTRAST Additional Contrast? None   Final Result   Cholelithiasis with gallbladder distension and moderate adjacent inflammatory   change, most compatible with cholecystitis. Postoperative change of the right kidney. Bilateral renal cysts are seen. An indeterminate 1.4 cm left renal lesion is seen, larger than the prior   examination, which can reflect complex cyst or neoplasm. In the nonacute   setting, renal protocol CT or MR is recommended for further characterization. Diverticulosis. Prostatomegaly. Correlate with urologic history. Assessment:  Principal Problem:    Acute cholecystitis  Active Problems:    Diabetes mellitus ([address]    Hypertension    Acid reflux    Diabetic peripheral neuropathy associated with type 2 diabetes mellitus (HCC)    Jaundice    Calculus of gallbladder with acute on chronic cholecystitis without obstruction    Elevated bilirubin    Elevated liver enzymes  Resolved Problems:    * No resolved hospital problems. *      Plan:  Acute cholecystitis  - General surgery consult--no OR plans acutely   - Continue IV antibiotics--received 3 days of Merrem, changed to Zosyn D#3   - Surgery following    Jaundice and elevated LFTs  - MRCP shows choledocholithiasis. -GI consult   - ERCP today 7/22    Diabetes mellitus type 2  - Monitor sugars. - Use sliding scale insulin  - BG still elevated, will increase SSI   - Hgb A1c : pending     Hypertension  - Blood pressure stable  - Continue BB     Restless leg  - On Requip. #Lovenox for DVT prophylaxis. ERCP today, symptoms improving    Julia Farr PA-C  7/22/2019 10:23 AM    Agree wit maggie  Possible d/c after ERCP    EUGENIA Bond.

## 2019-07-22 NOTE — PROGRESS NOTES
Discussed NPO status with patient, he verbalizes understanding and agreement. Food and drinks removed from bedside table.

## 2019-07-23 LAB
ESTIMATED AVERAGE GLUCOSE: 200.1 MG/DL
HBA1C MFR BLD: 8.6 %

## 2019-07-23 NOTE — DISCHARGE SUMMARY
for Details)  ERCP     Consults    General Surgery   GI      Physical Exam at Discharge:    BP (!) 174/86   Pulse 72   Temp 97.2 °F (36.2 °C) (Temporal)   Resp 18   Ht 5' 10.5\" (1.791 m)   Wt 225 lb 1.6 oz (102.1 kg)   SpO2 97%   BMI 31.84 kg/m²     General appearance: alert, appears stated age and cooperative  Head: Normocephalic, without obvious abnormality, atraumatic  Eyes: scleral icterus. PERRL, EOM's intact. Neck: no adenopathy, no carotid bruit, no JVD, supple, symmetrical, trachea midline and thyroid not enlarged, symmetric, no tenderness/mass/nodules  Lungs: clear to auscultation bilaterally  Heart: regular rate and rhythm, S1, S2 normal, no murmur, click, rub or gallop  Abdomen: soft, RUQ tenderness; bowel sounds normal; no masses,  no organomegaly  Extremities: extremities normal, atraumatic, no cyanosis or edema  Pulses: 2+ and symmetric  Skin: Skin color, texture, turgor normal. No rashes or lesions  Neurologic: Grossly normal    CBC:   Recent Labs     07/22/19  0508   WBC 6.2   HGB 13.8   HCT 40.6   MCV 90.6        BMP:   Recent Labs     07/21/19  0640 07/22/19  0508    135*   K 3.8 3.9    100   CO2 20* 19*   BUN 18 18   CREATININE 1.3 1.2     LIVER PROFILE:   Recent Labs     07/22/19  0508   AST 96*   *   BILITOT 4.0*   ALKPHOS 427*     PT/INR:   Recent Labs     07/22/19  0508   PROTIME 13.4*   INR 1.18*     Hemoglobin A1C 8.6     Hep A IgM Non-reactive  Non-reactive Final 07/17/2019  4:47 PM [facility]   Hep B Core Ab, IgM Non-reactive  Non-reactive Final 07/17/2019  4:47 PM [facility]   Hep B S Ag Interp Non-reactive  Non-reactive Final 07/17/2019  4:47 PM [facility]   Hep C Ab Interp Non-reactive  Non-reactive        CULTURES  Blood Cx: no growth after 5 days   Urine Cx: negative     RADIOLOGY  FL ERCP BILIARY AND PANCREATIC S&I   Final Result   1. Status post sphincterotomy and balloon sweep.   Please refer to the   procedure report for further details. MRI ABDOMEN WO CONTRAST MRCP   Final Result   1. Unchanged acute cholecystitis with a filling defect in the distal common   bile duct favoring a choledocholithiasis   2. Unchanged 1.2 cm left Bosniak type 2F renal cyst.  Recommend nonemergent   MRI of the abdomen with without contrast using renal mass protocol. US GALLBLADDER RUQ   Final Result   1. Findings of acute cholecystitis, similar to the recent CT. Associated   subcentimeter gallstones, sludge and gallbladder wall thickening. 2. No biliary dilation. CT ABDOMEN PELVIS W IV CONTRAST Additional Contrast? None   Final Result   Cholelithiasis with gallbladder distension and moderate adjacent inflammatory   change, most compatible with cholecystitis. Postoperative change of the right kidney. Bilateral renal cysts are seen. An indeterminate 1.4 cm left renal lesion is seen, larger than the prior   examination, which can reflect complex cyst or neoplasm. In the nonacute   setting, renal protocol CT or MR is recommended for further characterization. Diverticulosis. Prostatomegaly. Correlate with urologic history.                Discharge Medications     Medication List      START taking these medications    amoxicillin-clavulanate 500-125 MG per tablet  Commonly known as:  AUGMENTIN  Take 1 tablet by mouth 3 times daily for 4 days  Notes to patient:  Augmentin  Use: treat bacterial infections  Side effects:rash; hives; itching; shortness of breath; wheezing; cough        CHANGE how you take these medications    simvastatin 40 MG tablet  Commonly known as:  ZOCOR  Take 1 tablet by mouth nightly HOLD MEDICATION UNTIL AFTER YOUR FOLLOW UP WITH THE GI SPECIALIST  What changed:  additional instructions        CONTINUE taking these medications    aspirin 81 MG chewable tablet     glipiZIDE 10 MG tablet  Commonly known as:  GLUCOTROL     LEVEMIR 100 UNIT/ML injection vial  Generic drug:  insulin detemir     metoprolol tartrate 25 MG tablet  Commonly known as:  LOPRESSOR     omeprazole 20 MG delayed release capsule  Commonly known as:  PRILOSEC     rOPINIRole 0.5 MG tablet  Commonly known as:  REQUIP  Take 1 tablet by mouth nightly 1-2 hours before bedtime        ASK your doctor about these medications    ibuprofen 800 MG tablet  Commonly known as:  ADVIL;MOTRIN  Take 1 tablet by mouth every 8 hours as needed for Pain           Where to Get Your Medications      You can get these medications from any pharmacy    Bring a paper prescription for each of these medications  · amoxicillin-clavulanate 500-125 MG per tablet     Information about where to get these medications is not yet available    Ask your nurse or doctor about these medications  · simvastatin 40 MG tablet         Discharged in stable condition to Home    Follow Up: Follow up with PCP in 1 week and general surgery OP, GI OP    Total time spent on discharge is 35 minutes    EUGENIA Bond.

## 2019-08-06 ENCOUNTER — INITIAL CONSULT (OUTPATIENT)
Dept: SURGERY | Age: 79
End: 2019-08-06
Payer: MEDICARE

## 2019-08-06 VITALS
HEIGHT: 71 IN | SYSTOLIC BLOOD PRESSURE: 124 MMHG | WEIGHT: 218 LBS | BODY MASS INDEX: 30.52 KG/M2 | DIASTOLIC BLOOD PRESSURE: 78 MMHG

## 2019-08-06 DIAGNOSIS — K80.12 CALCULUS OF GALLBLADDER WITH ACUTE ON CHRONIC CHOLECYSTITIS WITHOUT OBSTRUCTION: Primary | ICD-10-CM

## 2019-08-06 PROCEDURE — 99213 OFFICE O/P EST LOW 20 MIN: CPT | Performed by: SURGERY

## 2019-08-06 RX ORDER — SODIUM CHLORIDE 0.9 % (FLUSH) 0.9 %
10 SYRINGE (ML) INJECTION EVERY 12 HOURS SCHEDULED
Status: CANCELLED | OUTPATIENT
Start: 2019-08-06

## 2019-08-06 RX ORDER — SODIUM CHLORIDE 0.9 % (FLUSH) 0.9 %
10 SYRINGE (ML) INJECTION PRN
Status: CANCELLED | OUTPATIENT
Start: 2019-08-06

## 2019-08-06 RX ORDER — HEPARIN SODIUM 5000 [USP'U]/ML
5000 INJECTION, SOLUTION INTRAVENOUS; SUBCUTANEOUS ONCE
Status: CANCELLED | OUTPATIENT
Start: 2019-08-06

## 2019-08-06 NOTE — PROGRESS NOTES
[facility] SURGERY    CHIEF COMPLAINT: None    SUBJECTIVE:   Patient presents for follow up of his cholecystitis and choledocholithiasis. He reports doing well. He denies persistent pain. He has been eating without difficulty. No Known Allergies  Outpatient Medications Marked as Taking for the 8/6/19 encounter (Initial consult) with Reta Louis MD   Medication Sig Dispense Refill    simvastatin (ZOCOR) 40 MG tablet Take 1 tablet by mouth nightly HOLD MEDICATION UNTIL AFTER YOUR FOLLOW UP WITH THE GI SPECIALIST 30 tablet 3    rOPINIRole (REQUIP) 0.5 MG tablet Take 1 tablet by mouth nightly 1-2 hours before bedtime 30 tablet 6    ibuprofen (ADVIL;MOTRIN) 800 MG tablet Take 1 tablet by mouth every 8 hours as needed for Pain 30 tablet 0    omeprazole (PRILOSEC) 20 MG capsule Take 40 mg by mouth daily       insulin detemir (LEVEMIR) 100 UNIT/ML injection vial Inject 55 Units into the skin 2 times daily       glipiZIDE (GLUCOTROL) 10 MG tablet Take 10 mg by mouth 2 times daily (before meals)       metoprolol (LOPRESSOR) 25 MG tablet Take by mouth nightly Indications: Take DOS       aspirin 81 MG chewable tablet Take 81 mg by mouth daily.  Indications: stopped 01-         Past Medical History:   Diagnosis Date    Achilles tendon tear     Acid reflux     Anesthesia     woke up during surery and was combative x1, hsd hsf surgery since and done OK    Calculus of gallbladder with acute on chronic cholecystitis without obstruction 7/18/2019    Diabetes mellitus ([address]     Heart murmur H/O    Hypercholesteremia     Hypertension     Right renal mass 5/16/2013    solid renal mass needing further work up     Past Surgical History:   Procedure Laterality Date    AORTIC VALVE REPLACEMENT      CARDIAC SURGERY      cardiac stents x2   [place] CARDIAC SURGERY  2013    [facility] [address] [place] [address] VALVE SURGERY  2013    Bovine heart valve    COLONOSCOPY      ERCP N/A 7/22/2019    ERCP

## 2019-08-08 ENCOUNTER — TELEPHONE (OUTPATIENT)
Dept: SURGERY | Age: 79
End: 2019-08-08

## 2019-08-08 NOTE — TELEPHONE ENCOUNTER
Called and spoke to Luis Ceron she has questions about giving him insulin the morning of surgery, I advised he can hold this until he can eat after surgery, call back with any other concerns.

## 2019-08-15 ENCOUNTER — ANESTHESIA EVENT (OUTPATIENT)
Dept: OPERATING ROOM | Age: 79
End: 2019-08-15
Payer: MEDICARE

## 2019-08-16 ENCOUNTER — HOSPITAL ENCOUNTER (OUTPATIENT)
Age: 79
Setting detail: OUTPATIENT SURGERY
Discharge: HOME | End: 2019-08-16
Attending: SURGERY | Admitting: SURGERY
Payer: MEDICARE

## 2019-08-16 ENCOUNTER — ANESTHESIA (OUTPATIENT)
Dept: OPERATING ROOM | Age: 79
End: 2019-08-16
Payer: MEDICARE

## 2019-08-16 ENCOUNTER — APPOINTMENT (OUTPATIENT)
Dept: GENERAL RADIOLOGY | Age: 79
End: 2019-08-16
Attending: SURGERY
Payer: MEDICARE

## 2019-08-16 VITALS
RESPIRATION RATE: 19 BRPM | OXYGEN SATURATION: 93 % | HEART RATE: 74 BPM | HEIGHT: 69 IN | DIASTOLIC BLOOD PRESSURE: 56 MMHG | WEIGHT: 223 LBS | TEMPERATURE: 97.6 F | SYSTOLIC BLOOD PRESSURE: 117 MMHG | BODY MASS INDEX: 33.03 KG/M2

## 2019-08-16 VITALS
SYSTOLIC BLOOD PRESSURE: 125 MMHG | DIASTOLIC BLOOD PRESSURE: 60 MMHG | RESPIRATION RATE: 11 BRPM | OXYGEN SATURATION: 98 %

## 2019-08-16 DIAGNOSIS — K80.12 CALCULUS OF GALLBLADDER WITH ACUTE ON CHRONIC CHOLECYSTITIS WITHOUT OBSTRUCTION: Primary | ICD-10-CM

## 2019-08-16 LAB
GLUCOSE BLD-MCNC: 121 MG/DL (ref 70–99)
PERFORMED ON: ABNORMAL

## 2019-08-16 PROCEDURE — 7100000011 HC PHASE II RECOVERY - ADDTL 15 MIN: Performed by: SURGERY

## 2019-08-16 PROCEDURE — 2580000003 HC RX 258: Performed by: SURGERY

## 2019-08-16 PROCEDURE — 2580000003 HC RX 258: Performed by: ANESTHESIOLOGY

## 2019-08-16 PROCEDURE — 88304 TISSUE EXAM BY PATHOLOGIST: CPT

## 2019-08-16 PROCEDURE — 2500000003 HC RX 250 WO HCPCS: Performed by: NURSE ANESTHETIST, CERTIFIED REGISTERED

## 2019-08-16 PROCEDURE — 7100000000 HC PACU RECOVERY - FIRST 15 MIN: Performed by: SURGERY

## 2019-08-16 PROCEDURE — 6360000004 HC RX CONTRAST MEDICATION: Performed by: SURGERY

## 2019-08-16 PROCEDURE — 6360000002 HC RX W HCPCS: Performed by: SURGERY

## 2019-08-16 PROCEDURE — 6370000000 HC RX 637 (ALT 250 FOR IP): Performed by: ANESTHESIOLOGY

## 2019-08-16 PROCEDURE — 2500000003 HC RX 250 WO HCPCS: Performed by: ANESTHESIOLOGY

## 2019-08-16 PROCEDURE — 3600000014 HC SURGERY LEVEL 4 ADDTL 15MIN: Performed by: SURGERY

## 2019-08-16 PROCEDURE — 2709999900 HC NON-CHARGEABLE SUPPLY: Performed by: SURGERY

## 2019-08-16 PROCEDURE — 2500000003 HC RX 250 WO HCPCS: Performed by: SURGERY

## 2019-08-16 PROCEDURE — 3600000004 HC SURGERY LEVEL 4 BASE: Performed by: SURGERY

## 2019-08-16 PROCEDURE — 2720000010 HC SURG SUPPLY STERILE: Performed by: SURGERY

## 2019-08-16 PROCEDURE — 6360000002 HC RX W HCPCS: Performed by: NURSE ANESTHETIST, CERTIFIED REGISTERED

## 2019-08-16 PROCEDURE — 3700000001 HC ADD 15 MINUTES (ANESTHESIA): Performed by: SURGERY

## 2019-08-16 PROCEDURE — 3700000000 HC ANESTHESIA ATTENDED CARE: Performed by: SURGERY

## 2019-08-16 PROCEDURE — 47563 LAPARO CHOLECYSTECTOMY/GRAPH: CPT | Performed by: SURGERY

## 2019-08-16 PROCEDURE — 7100000001 HC PACU RECOVERY - ADDTL 15 MIN: Performed by: SURGERY

## 2019-08-16 PROCEDURE — 7100000010 HC PHASE II RECOVERY - FIRST 15 MIN: Performed by: SURGERY

## 2019-08-16 PROCEDURE — 6360000002 HC RX W HCPCS: Performed by: ANESTHESIOLOGY

## 2019-08-16 PROCEDURE — 74300 X-RAY BILE DUCTS/PANCREAS: CPT

## 2019-08-16 RX ORDER — DEXAMETHASONE SODIUM PHOSPHATE 4 MG/ML
INJECTION, SOLUTION INTRA-ARTICULAR; INTRALESIONAL; INTRAMUSCULAR; INTRAVENOUS; SOFT TISSUE PRN
Status: DISCONTINUED | OUTPATIENT
Start: 2019-08-16 | End: 2019-08-16 | Stop reason: SDUPTHER

## 2019-08-16 RX ORDER — MORPHINE SULFATE 10 MG/ML
2 INJECTION, SOLUTION INTRAMUSCULAR; INTRAVENOUS EVERY 5 MIN PRN
Status: DISCONTINUED | OUTPATIENT
Start: 2019-08-16 | End: 2019-08-16 | Stop reason: HOSPADM

## 2019-08-16 RX ORDER — BUPIVACAINE HYDROCHLORIDE 5 MG/ML
INJECTION, SOLUTION EPIDURAL; INTRACAUDAL PRN
Status: DISCONTINUED | OUTPATIENT
Start: 2019-08-16 | End: 2019-08-16 | Stop reason: ALTCHOICE

## 2019-08-16 RX ORDER — LABETALOL HYDROCHLORIDE 5 MG/ML
5 INJECTION, SOLUTION INTRAVENOUS EVERY 10 MIN PRN
Status: DISCONTINUED | OUTPATIENT
Start: 2019-08-16 | End: 2019-08-16 | Stop reason: HOSPADM

## 2019-08-16 RX ORDER — PROMETHAZINE HYDROCHLORIDE 25 MG/ML
6.25 INJECTION, SOLUTION INTRAMUSCULAR; INTRAVENOUS
Status: DISCONTINUED | OUTPATIENT
Start: 2019-08-16 | End: 2019-08-16 | Stop reason: HOSPADM

## 2019-08-16 RX ORDER — ONDANSETRON 2 MG/ML
4 INJECTION INTRAMUSCULAR; INTRAVENOUS
Status: COMPLETED | OUTPATIENT
Start: 2019-08-16 | End: 2019-08-16

## 2019-08-16 RX ORDER — SODIUM CHLORIDE, SODIUM LACTATE, POTASSIUM CHLORIDE, AND CALCIUM CHLORIDE .6; .31; .03; .02 G/100ML; G/100ML; G/100ML; G/100ML
IRRIGANT IRRIGATION PRN
Status: DISCONTINUED | OUTPATIENT
Start: 2019-08-16 | End: 2019-08-16 | Stop reason: ALTCHOICE

## 2019-08-16 RX ORDER — ONDANSETRON 2 MG/ML
INJECTION INTRAMUSCULAR; INTRAVENOUS PRN
Status: DISCONTINUED | OUTPATIENT
Start: 2019-08-16 | End: 2019-08-16 | Stop reason: SDUPTHER

## 2019-08-16 RX ORDER — OXYCODONE HYDROCHLORIDE AND ACETAMINOPHEN 5; 325 MG/1; MG/1
1 TABLET ORAL EVERY 6 HOURS PRN
Qty: 28 TABLET | Refills: 0 | Status: SHIPPED | OUTPATIENT
Start: 2019-08-16 | End: 2019-08-23

## 2019-08-16 RX ORDER — HYDRALAZINE HYDROCHLORIDE 20 MG/ML
5 INJECTION INTRAMUSCULAR; INTRAVENOUS EVERY 10 MIN PRN
Status: DISCONTINUED | OUTPATIENT
Start: 2019-08-16 | End: 2019-08-16 | Stop reason: HOSPADM

## 2019-08-16 RX ORDER — MORPHINE SULFATE 10 MG/ML
1 INJECTION, SOLUTION INTRAMUSCULAR; INTRAVENOUS EVERY 5 MIN PRN
Status: DISCONTINUED | OUTPATIENT
Start: 2019-08-16 | End: 2019-08-16 | Stop reason: HOSPADM

## 2019-08-16 RX ORDER — HYDRALAZINE HYDROCHLORIDE 20 MG/ML
INJECTION INTRAMUSCULAR; INTRAVENOUS PRN
Status: DISCONTINUED | OUTPATIENT
Start: 2019-08-16 | End: 2019-08-16 | Stop reason: SDUPTHER

## 2019-08-16 RX ORDER — PROPOFOL 10 MG/ML
INJECTION, EMULSION INTRAVENOUS PRN
Status: DISCONTINUED | OUTPATIENT
Start: 2019-08-16 | End: 2019-08-16 | Stop reason: SDUPTHER

## 2019-08-16 RX ORDER — LIDOCAINE HYDROCHLORIDE 20 MG/ML
INJECTION, SOLUTION INFILTRATION; PERINEURAL PRN
Status: DISCONTINUED | OUTPATIENT
Start: 2019-08-16 | End: 2019-08-16 | Stop reason: SDUPTHER

## 2019-08-16 RX ORDER — SODIUM CHLORIDE 0.9 % (FLUSH) 0.9 %
10 SYRINGE (ML) INJECTION EVERY 12 HOURS SCHEDULED
Status: DISCONTINUED | OUTPATIENT
Start: 2019-08-16 | End: 2019-08-16 | Stop reason: HOSPADM

## 2019-08-16 RX ORDER — DIPHENHYDRAMINE HYDROCHLORIDE 50 MG/ML
12.5 INJECTION INTRAMUSCULAR; INTRAVENOUS
Status: DISCONTINUED | OUTPATIENT
Start: 2019-08-16 | End: 2019-08-16 | Stop reason: HOSPADM

## 2019-08-16 RX ORDER — FENTANYL CITRATE 50 UG/ML
INJECTION, SOLUTION INTRAMUSCULAR; INTRAVENOUS PRN
Status: DISCONTINUED | OUTPATIENT
Start: 2019-08-16 | End: 2019-08-16 | Stop reason: SDUPTHER

## 2019-08-16 RX ORDER — OXYCODONE HYDROCHLORIDE AND ACETAMINOPHEN 5; 325 MG/1; MG/1
1 TABLET ORAL PRN
Status: COMPLETED | OUTPATIENT
Start: 2019-08-16 | End: 2019-08-16

## 2019-08-16 RX ORDER — MAGNESIUM HYDROXIDE 1200 MG/15ML
LIQUID ORAL CONTINUOUS PRN
Status: COMPLETED | OUTPATIENT
Start: 2019-08-16 | End: 2019-08-16

## 2019-08-16 RX ORDER — SODIUM CHLORIDE, SODIUM LACTATE, POTASSIUM CHLORIDE, CALCIUM CHLORIDE 600; 310; 30; 20 MG/100ML; MG/100ML; MG/100ML; MG/100ML
INJECTION, SOLUTION INTRAVENOUS CONTINUOUS
Status: DISCONTINUED | OUTPATIENT
Start: 2019-08-16 | End: 2019-08-16 | Stop reason: HOSPADM

## 2019-08-16 RX ORDER — OXYCODONE HYDROCHLORIDE AND ACETAMINOPHEN 5; 325 MG/1; MG/1
2 TABLET ORAL PRN
Status: COMPLETED | OUTPATIENT
Start: 2019-08-16 | End: 2019-08-16

## 2019-08-16 RX ORDER — HEPARIN SODIUM 5000 [USP'U]/ML
5000 INJECTION, SOLUTION INTRAVENOUS; SUBCUTANEOUS ONCE
Status: COMPLETED | OUTPATIENT
Start: 2019-08-16 | End: 2019-08-16

## 2019-08-16 RX ORDER — SODIUM CHLORIDE 0.9 % (FLUSH) 0.9 %
10 SYRINGE (ML) INJECTION PRN
Status: DISCONTINUED | OUTPATIENT
Start: 2019-08-16 | End: 2019-08-16 | Stop reason: HOSPADM

## 2019-08-16 RX ORDER — LIDOCAINE HYDROCHLORIDE 10 MG/ML
1 INJECTION, SOLUTION EPIDURAL; INFILTRATION; INTRACAUDAL; PERINEURAL
Status: COMPLETED | OUTPATIENT
Start: 2019-08-16 | End: 2019-08-16

## 2019-08-16 RX ORDER — ROCURONIUM BROMIDE 10 MG/ML
INJECTION, SOLUTION INTRAVENOUS PRN
Status: DISCONTINUED | OUTPATIENT
Start: 2019-08-16 | End: 2019-08-16 | Stop reason: SDUPTHER

## 2019-08-16 RX ADMIN — ONDANSETRON 4 MG: 2 INJECTION, SOLUTION INTRAMUSCULAR; INTRAVENOUS at 09:30

## 2019-08-16 RX ADMIN — HEPARIN SODIUM 5000 UNITS: 5000 INJECTION, SOLUTION INTRAVENOUS; SUBCUTANEOUS at 07:36

## 2019-08-16 RX ADMIN — SODIUM CHLORIDE, POTASSIUM CHLORIDE, SODIUM LACTATE AND CALCIUM CHLORIDE: 600; 310; 30; 20 INJECTION, SOLUTION INTRAVENOUS at 09:21

## 2019-08-16 RX ADMIN — HYDRALAZINE HYDROCHLORIDE 5 MG: 20 INJECTION INTRAMUSCULAR; INTRAVENOUS at 09:46

## 2019-08-16 RX ADMIN — FENTANYL CITRATE 25 MCG: 50 INJECTION, SOLUTION INTRAMUSCULAR; INTRAVENOUS at 09:21

## 2019-08-16 RX ADMIN — SODIUM CHLORIDE, POTASSIUM CHLORIDE, SODIUM LACTATE AND CALCIUM CHLORIDE: 600; 310; 30; 20 INJECTION, SOLUTION INTRAVENOUS at 07:36

## 2019-08-16 RX ADMIN — SUGAMMADEX 200 MG: 100 INJECTION, SOLUTION INTRAVENOUS at 10:05

## 2019-08-16 RX ADMIN — FENTANYL CITRATE 50 MCG: 50 INJECTION, SOLUTION INTRAMUSCULAR; INTRAVENOUS at 10:06

## 2019-08-16 RX ADMIN — HYDRALAZINE HYDROCHLORIDE 5 MG: 20 INJECTION INTRAMUSCULAR; INTRAVENOUS at 09:40

## 2019-08-16 RX ADMIN — PROPOFOL 30 MG: 10 INJECTION, EMULSION INTRAVENOUS at 09:21

## 2019-08-16 RX ADMIN — ROCURONIUM BROMIDE 40 MG: 10 INJECTION, SOLUTION INTRAVENOUS at 09:21

## 2019-08-16 RX ADMIN — LIDOCAINE HYDROCHLORIDE 40 MG: 20 INJECTION, SOLUTION INFILTRATION; PERINEURAL at 09:21

## 2019-08-16 RX ADMIN — ONDANSETRON 4 MG: 2 INJECTION INTRAMUSCULAR; INTRAVENOUS at 10:34

## 2019-08-16 RX ADMIN — HYDROMORPHONE HYDROCHLORIDE 0.5 MG: 1 INJECTION, SOLUTION INTRAMUSCULAR; INTRAVENOUS; SUBCUTANEOUS at 10:57

## 2019-08-16 RX ADMIN — Medication 2 G: at 09:12

## 2019-08-16 RX ADMIN — OXYCODONE HYDROCHLORIDE AND ACETAMINOPHEN 1 TABLET: 5; 325 TABLET ORAL at 11:40

## 2019-08-16 RX ADMIN — DEXAMETHASONE SODIUM PHOSPHATE 4 MG: 4 INJECTION, SOLUTION INTRAMUSCULAR; INTRAVENOUS at 09:30

## 2019-08-16 RX ADMIN — LIDOCAINE HYDROCHLORIDE 1 ML: 10 INJECTION, SOLUTION EPIDURAL; INFILTRATION; INTRACAUDAL; PERINEURAL at 07:37

## 2019-08-16 ASSESSMENT — PULMONARY FUNCTION TESTS
PIF_VALUE: 32
PIF_VALUE: 0
PIF_VALUE: 30
PIF_VALUE: 30
PIF_VALUE: 4
PIF_VALUE: 19
PIF_VALUE: 30
PIF_VALUE: 30
PIF_VALUE: 25
PIF_VALUE: 0
PIF_VALUE: 30
PIF_VALUE: 29
PIF_VALUE: 3
PIF_VALUE: 31
PIF_VALUE: 3
PIF_VALUE: 29
PIF_VALUE: 31
PIF_VALUE: 20
PIF_VALUE: 28
PIF_VALUE: 31
PIF_VALUE: 31
PIF_VALUE: 20
PIF_VALUE: 30
PIF_VALUE: 33
PIF_VALUE: 31
PIF_VALUE: 31
PIF_VALUE: 0
PIF_VALUE: 36
PIF_VALUE: 32
PIF_VALUE: 4
PIF_VALUE: 31
PIF_VALUE: 29
PIF_VALUE: 0
PIF_VALUE: 3
PIF_VALUE: 20
PIF_VALUE: 31
PIF_VALUE: 20
PIF_VALUE: 4
PIF_VALUE: 24
PIF_VALUE: 19
PIF_VALUE: 4
PIF_VALUE: 30
PIF_VALUE: 1
PIF_VALUE: 30
PIF_VALUE: 3
PIF_VALUE: 29
PIF_VALUE: 28
PIF_VALUE: 3
PIF_VALUE: 31
PIF_VALUE: 7
PIF_VALUE: 3
PIF_VALUE: 0
PIF_VALUE: 13
PIF_VALUE: 31
PIF_VALUE: 0
PIF_VALUE: 31
PIF_VALUE: 23
PIF_VALUE: 3
PIF_VALUE: 30
PIF_VALUE: 21
PIF_VALUE: 35
PIF_VALUE: 3
PIF_VALUE: 25
PIF_VALUE: 3
PIF_VALUE: 4

## 2019-08-16 ASSESSMENT — PAIN SCALES - GENERAL
PAINLEVEL_OUTOF10: 7
PAINLEVEL_OUTOF10: 4

## 2019-08-16 ASSESSMENT — PAIN - FUNCTIONAL ASSESSMENT
PAIN_FUNCTIONAL_ASSESSMENT: 0-10

## 2019-09-03 ENCOUNTER — OFFICE VISIT (OUTPATIENT)
Dept: SURGERY | Age: 79
End: 2019-09-03

## 2019-09-03 VITALS
HEIGHT: 69 IN | BODY MASS INDEX: 33.44 KG/M2 | DIASTOLIC BLOOD PRESSURE: 72 MMHG | SYSTOLIC BLOOD PRESSURE: 126 MMHG | WEIGHT: 225.8 LBS

## 2019-09-03 DIAGNOSIS — Z09 POSTOP CHECK: Primary | ICD-10-CM

## 2019-09-03 PROCEDURE — 99024 POSTOP FOLLOW-UP VISIT: CPT | Performed by: SURGERY

## 2019-09-03 NOTE — PROGRESS NOTES
[facility] GENERAL SURGERY      S:   Patient presents s/p laparoscopic cholecystectomy with intraoperative cholangiogram.  He reports doing well. O:   Comfortable         Incision sites healing well. A:   S/P laparoscopic cholecystectomy with intraoperative cholangiogram    P:   Follow up as needed.

## 2020-10-12 ENCOUNTER — APPOINTMENT (OUTPATIENT)
Dept: GENERAL RADIOLOGY | Age: 80
End: 2020-10-12
Payer: MEDICARE

## 2020-10-12 ENCOUNTER — APPOINTMENT (OUTPATIENT)
Dept: CT IMAGING | Age: 80
End: 2020-10-12
Payer: MEDICARE

## 2020-10-12 ENCOUNTER — HOSPITAL ENCOUNTER (EMERGENCY)
Age: 80
Discharge: ANOTHER HOSPITAL | End: 2020-10-12
Attending: STUDENT IN AN ORGANIZED HEALTH CARE EDUCATION/TRAINING PROGRAM
Payer: MEDICARE

## 2020-10-12 ENCOUNTER — HOSPITAL ENCOUNTER (INPATIENT)
Age: 80
LOS: 2 days | Discharge: HOME | DRG: 100 | End: 2020-10-14
Attending: HOSPITALIST | Admitting: HOSPITALIST
Payer: MEDICARE

## 2020-10-12 VITALS
SYSTOLIC BLOOD PRESSURE: 124 MMHG | OXYGEN SATURATION: 100 % | WEIGHT: 225 LBS | TEMPERATURE: 98 F | HEART RATE: 101 BPM | RESPIRATION RATE: 10 BRPM | DIASTOLIC BLOOD PRESSURE: 76 MMHG | BODY MASS INDEX: 33.21 KG/M2

## 2020-10-12 PROBLEM — Z98.61 CAD S/P PERCUTANEOUS CORONARY ANGIOPLASTY: Chronic | Status: ACTIVE | Noted: 2020-10-12

## 2020-10-12 PROBLEM — R56.9 SEIZURE-LIKE ACTIVITY (HCC): Status: ACTIVE | Noted: 2020-10-12

## 2020-10-12 PROBLEM — Z95.3 HISTORY OF AORTIC VALVE REPLACEMENT WITH BIOPROSTHETIC VALVE: Chronic | Status: ACTIVE | Noted: 2020-10-12

## 2020-10-12 PROBLEM — I25.10 CAD S/P PERCUTANEOUS CORONARY ANGIOPLASTY: Chronic | Status: ACTIVE | Noted: 2020-10-12

## 2020-10-12 PROBLEM — R41.82 AMS (ALTERED MENTAL STATUS): Status: ACTIVE | Noted: 2020-10-12

## 2020-10-12 PROBLEM — Z90.5 H/O PARTIAL NEPHRECTOMY: Chronic | Status: ACTIVE | Noted: 2020-10-12

## 2020-10-12 PROBLEM — G47.33 OSA (OBSTRUCTIVE SLEEP APNEA): Chronic | Status: ACTIVE | Noted: 2020-10-12

## 2020-10-12 PROBLEM — Z98.890 S/P LEFT ATRIAL APPENDAGE LIGATION: Chronic | Status: ACTIVE | Noted: 2020-10-12

## 2020-10-12 LAB
A/G RATIO: 1.2 (ref 1.1–2.2)
ALBUMIN SERPL-MCNC: 3.9 G/DL (ref 3.4–5)
ALP BLD-CCNC: 111 U/L (ref 40–129)
ALT SERPL-CCNC: 16 U/L (ref 10–40)
AMORPHOUS: ABNORMAL /HPF
ANION GAP SERPL CALCULATED.3IONS-SCNC: 12 MMOL/L (ref 3–16)
AST SERPL-CCNC: 27 U/L (ref 15–37)
BACTERIA: ABNORMAL /HPF
BASOPHILS ABSOLUTE: 0.1 K/UL (ref 0–0.2)
BASOPHILS RELATIVE PERCENT: 0.6 %
BILIRUB SERPL-MCNC: 0.5 MG/DL (ref 0–1)
BILIRUBIN URINE: NEGATIVE
BLOOD, URINE: ABNORMAL
BUN BLDV-MCNC: 20 MG/DL (ref 7–20)
CALCIUM SERPL-MCNC: 9.2 MG/DL (ref 8.3–10.6)
CHLORIDE BLD-SCNC: 106 MMOL/L (ref 99–110)
CLARITY: CLEAR
CO2: 21 MMOL/L (ref 21–32)
COLOR: YELLOW
CREAT SERPL-MCNC: 1.4 MG/DL (ref 0.8–1.3)
EKG ATRIAL RATE: 86 BPM
EKG DIAGNOSIS: NORMAL
EKG P AXIS: 38 DEGREES
EKG P-R INTERVAL: 176 MS
EKG Q-T INTERVAL: 430 MS
EKG QRS DURATION: 156 MS
EKG QTC CALCULATION (BAZETT): 514 MS
EKG R AXIS: -78 DEGREES
EKG T AXIS: 73 DEGREES
EKG VENTRICULAR RATE: 86 BPM
EOSINOPHILS ABSOLUTE: 0.1 K/UL (ref 0–0.6)
EOSINOPHILS RELATIVE PERCENT: 1.5 %
GFR AFRICAN AMERICAN: 59
GFR NON-AFRICAN AMERICAN: 49
GLOBULIN: 3.2 G/DL
GLUCOSE BLD-MCNC: 110 MG/DL (ref 70–99)
GLUCOSE BLD-MCNC: 128 MG/DL (ref 70–99)
GLUCOSE BLD-MCNC: 130 MG/DL (ref 70–99)
GLUCOSE BLD-MCNC: 49 MG/DL (ref 70–99)
GLUCOSE BLD-MCNC: 54 MG/DL (ref 70–99)
GLUCOSE BLD-MCNC: 80 MG/DL (ref 70–99)
GLUCOSE URINE: NEGATIVE MG/DL
HCT VFR BLD CALC: 48.1 % (ref 40.5–52.5)
HEMOGLOBIN: 16.3 G/DL (ref 13.5–17.5)
KETONES, URINE: NEGATIVE MG/DL
LEUKOCYTE ESTERASE, URINE: NEGATIVE
LYMPHOCYTES ABSOLUTE: 1.3 K/UL (ref 1–5.1)
LYMPHOCYTES RELATIVE PERCENT: 13.8 %
MCH RBC QN AUTO: 31.2 PG (ref 26–34)
MCHC RBC AUTO-ENTMCNC: 33.9 G/DL (ref 31–36)
MCV RBC AUTO: 91.9 FL (ref 80–100)
MICROSCOPIC EXAMINATION: YES
MONOCYTES ABSOLUTE: 0.7 K/UL (ref 0–1.3)
MONOCYTES RELATIVE PERCENT: 7.3 %
NEUTROPHILS ABSOLUTE: 7.1 K/UL (ref 1.7–7.7)
NEUTROPHILS RELATIVE PERCENT: 76.8 %
NITRITE, URINE: NEGATIVE
PDW BLD-RTO: 15.7 % (ref 12.4–15.4)
PERFORMED ON: ABNORMAL
PERFORMED ON: NORMAL
PH UA: 6 (ref 5–8)
PLATELET # BLD: 239 K/UL (ref 135–450)
PMV BLD AUTO: 7.9 FL (ref 5–10.5)
POTASSIUM REFLEX MAGNESIUM: 4.8 MMOL/L (ref 3.5–5.1)
PROTEIN UA: ABNORMAL MG/DL
RBC # BLD: 5.23 M/UL (ref 4.2–5.9)
RBC UA: ABNORMAL /HPF (ref 0–4)
SODIUM BLD-SCNC: 139 MMOL/L (ref 136–145)
SPECIFIC GRAVITY UA: 1.02 (ref 1–1.03)
TOTAL PROTEIN: 7.1 G/DL (ref 6.4–8.2)
TROPONIN: 0.08 NG/ML
TROPONIN: 0.11 NG/ML
URINE REFLEX TO CULTURE: ABNORMAL
URINE TYPE: ABNORMAL
UROBILINOGEN, URINE: 0.2 E.U./DL
WBC # BLD: 9.2 K/UL (ref 4–11)
WBC UA: ABNORMAL /HPF (ref 0–5)

## 2020-10-12 PROCEDURE — 70450 CT HEAD/BRAIN W/O DYE: CPT

## 2020-10-12 PROCEDURE — 84484 ASSAY OF TROPONIN QUANT: CPT

## 2020-10-12 PROCEDURE — G0378 HOSPITAL OBSERVATION PER HR: HCPCS

## 2020-10-12 PROCEDURE — G0379 DIRECT REFER HOSPITAL OBSERV: HCPCS

## 2020-10-12 PROCEDURE — 2580000003 HC RX 258: Performed by: PHYSICIAN ASSISTANT

## 2020-10-12 PROCEDURE — 6370000000 HC RX 637 (ALT 250 FOR IP): Performed by: INTERNAL MEDICINE

## 2020-10-12 PROCEDURE — 93005 ELECTROCARDIOGRAM TRACING: CPT | Performed by: PHYSICIAN ASSISTANT

## 2020-10-12 PROCEDURE — 99285 EMERGENCY DEPT VISIT HI MDM: CPT

## 2020-10-12 PROCEDURE — 6360000002 HC RX W HCPCS: Performed by: STUDENT IN AN ORGANIZED HEALTH CARE EDUCATION/TRAINING PROGRAM

## 2020-10-12 PROCEDURE — 80053 COMPREHEN METABOLIC PANEL: CPT

## 2020-10-12 PROCEDURE — 93010 ELECTROCARDIOGRAM REPORT: CPT | Performed by: INTERNAL MEDICINE

## 2020-10-12 PROCEDURE — 96374 THER/PROPH/DIAG INJ IV PUSH: CPT

## 2020-10-12 PROCEDURE — 2580000003 HC RX 258: Performed by: INTERNAL MEDICINE

## 2020-10-12 PROCEDURE — 71046 X-RAY EXAM CHEST 2 VIEWS: CPT

## 2020-10-12 PROCEDURE — 81001 URINALYSIS AUTO W/SCOPE: CPT

## 2020-10-12 PROCEDURE — 1200000000 HC SEMI PRIVATE

## 2020-10-12 PROCEDURE — 6370000000 HC RX 637 (ALT 250 FOR IP): Performed by: PHYSICIAN ASSISTANT

## 2020-10-12 PROCEDURE — 85025 COMPLETE CBC W/AUTO DIFF WBC: CPT

## 2020-10-12 RX ORDER — DEXTROSE MONOHYDRATE 25 G/50ML
12.5 INJECTION, SOLUTION INTRAVENOUS PRN
Status: DISCONTINUED | OUTPATIENT
Start: 2020-10-12 | End: 2020-10-14 | Stop reason: HOSPADM

## 2020-10-12 RX ORDER — POTASSIUM CHLORIDE 20 MEQ/1
40 TABLET, EXTENDED RELEASE ORAL PRN
Status: DISCONTINUED | OUTPATIENT
Start: 2020-10-12 | End: 2020-10-14 | Stop reason: HOSPADM

## 2020-10-12 RX ORDER — ACETAMINOPHEN 325 MG/1
650 TABLET ORAL EVERY 6 HOURS PRN
Status: DISCONTINUED | OUTPATIENT
Start: 2020-10-12 | End: 2020-10-14 | Stop reason: HOSPADM

## 2020-10-12 RX ORDER — INSULIN DEGLUDEC INJECTION 100 U/ML
68 INJECTION, SOLUTION SUBCUTANEOUS DAILY
Status: ON HOLD | COMMUNITY
End: 2020-10-14 | Stop reason: SDUPTHER

## 2020-10-12 RX ORDER — ATORVASTATIN CALCIUM 10 MG/1
20 TABLET, FILM COATED ORAL DAILY
Status: DISCONTINUED | OUTPATIENT
Start: 2020-10-12 | End: 2020-10-14 | Stop reason: HOSPADM

## 2020-10-12 RX ORDER — PROMETHAZINE HYDROCHLORIDE 25 MG/1
12.5 TABLET ORAL EVERY 6 HOURS PRN
Status: DISCONTINUED | OUTPATIENT
Start: 2020-10-12 | End: 2020-10-14 | Stop reason: HOSPADM

## 2020-10-12 RX ORDER — ASPIRIN 81 MG/1
81 TABLET, CHEWABLE ORAL DAILY
Status: DISCONTINUED | OUTPATIENT
Start: 2020-10-13 | End: 2020-10-14 | Stop reason: HOSPADM

## 2020-10-12 RX ORDER — ROPINIROLE 0.5 MG/1
0.5 TABLET, FILM COATED ORAL NIGHTLY
Status: DISCONTINUED | OUTPATIENT
Start: 2020-10-12 | End: 2020-10-14 | Stop reason: HOSPADM

## 2020-10-12 RX ORDER — POTASSIUM CHLORIDE 7.45 MG/ML
10 INJECTION INTRAVENOUS PRN
Status: DISCONTINUED | OUTPATIENT
Start: 2020-10-12 | End: 2020-10-14 | Stop reason: HOSPADM

## 2020-10-12 RX ORDER — ACETAMINOPHEN 650 MG/1
650 SUPPOSITORY RECTAL EVERY 6 HOURS PRN
Status: DISCONTINUED | OUTPATIENT
Start: 2020-10-12 | End: 2020-10-14 | Stop reason: HOSPADM

## 2020-10-12 RX ORDER — MAGNESIUM SULFATE 1 G/100ML
1 INJECTION INTRAVENOUS PRN
Status: DISCONTINUED | OUTPATIENT
Start: 2020-10-12 | End: 2020-10-14 | Stop reason: HOSPADM

## 2020-10-12 RX ORDER — LORAZEPAM 2 MG/ML
4 INJECTION INTRAMUSCULAR PRN
Status: DISCONTINUED | OUTPATIENT
Start: 2020-10-12 | End: 2020-10-14 | Stop reason: HOSPADM

## 2020-10-12 RX ORDER — ONDANSETRON 2 MG/ML
4 INJECTION INTRAMUSCULAR; INTRAVENOUS EVERY 6 HOURS PRN
Status: DISCONTINUED | OUTPATIENT
Start: 2020-10-12 | End: 2020-10-14 | Stop reason: HOSPADM

## 2020-10-12 RX ORDER — PANTOPRAZOLE SODIUM 40 MG/1
40 TABLET, DELAYED RELEASE ORAL
Status: DISCONTINUED | OUTPATIENT
Start: 2020-10-13 | End: 2020-10-14 | Stop reason: HOSPADM

## 2020-10-12 RX ORDER — ONDANSETRON 2 MG/ML
4 INJECTION INTRAMUSCULAR; INTRAVENOUS EVERY 6 HOURS PRN
Status: DISCONTINUED | OUTPATIENT
Start: 2020-10-12 | End: 2020-10-12 | Stop reason: HOSPADM

## 2020-10-12 RX ORDER — DEXTROSE MONOHYDRATE 25 G/50ML
25 INJECTION, SOLUTION INTRAVENOUS ONCE
Status: COMPLETED | OUTPATIENT
Start: 2020-10-12 | End: 2020-10-12

## 2020-10-12 RX ORDER — HALOPERIDOL 10 MG/1
5 TABLET ORAL
Status: DISPENSED | OUTPATIENT
Start: 2020-10-12 | End: 2020-10-12

## 2020-10-12 RX ORDER — DEXTROSE MONOHYDRATE 50 MG/ML
100 INJECTION, SOLUTION INTRAVENOUS PRN
Status: DISCONTINUED | OUTPATIENT
Start: 2020-10-12 | End: 2020-10-14 | Stop reason: HOSPADM

## 2020-10-12 RX ORDER — SODIUM CHLORIDE 0.9 % (FLUSH) 0.9 %
10 SYRINGE (ML) INJECTION PRN
Status: DISCONTINUED | OUTPATIENT
Start: 2020-10-12 | End: 2020-10-14 | Stop reason: HOSPADM

## 2020-10-12 RX ORDER — ONDANSETRON 2 MG/ML
INJECTION INTRAMUSCULAR; INTRAVENOUS
Status: DISCONTINUED
Start: 2020-10-12 | End: 2020-10-12 | Stop reason: HOSPADM

## 2020-10-12 RX ORDER — GLIMEPIRIDE 1 MG/1
TABLET ORAL DAILY
Status: ON HOLD | COMMUNITY
End: 2020-10-14 | Stop reason: HOSPADM

## 2020-10-12 RX ORDER — INSULIN DEGLUDEC INJECTION 100 U/ML
53 INJECTION, SOLUTION SUBCUTANEOUS NIGHTLY
Status: ON HOLD | COMMUNITY
End: 2020-10-14 | Stop reason: HOSPADM

## 2020-10-12 RX ORDER — ASPIRIN 325 MG
325 TABLET ORAL ONCE
Status: COMPLETED | OUTPATIENT
Start: 2020-10-12 | End: 2020-10-12

## 2020-10-12 RX ORDER — NICOTINE POLACRILEX 4 MG
15 LOZENGE BUCCAL PRN
Status: DISCONTINUED | OUTPATIENT
Start: 2020-10-12 | End: 2020-10-14 | Stop reason: HOSPADM

## 2020-10-12 RX ADMIN — METOPROLOL TARTRATE 25 MG: 25 TABLET, FILM COATED ORAL at 21:46

## 2020-10-12 RX ADMIN — ONDANSETRON HYDROCHLORIDE 4 MG: 2 INJECTION, SOLUTION INTRAMUSCULAR; INTRAVENOUS at 13:43

## 2020-10-12 RX ADMIN — ROPINIROLE HYDROCHLORIDE 0.5 MG: 0.5 TABLET, FILM COATED ORAL at 21:46

## 2020-10-12 RX ADMIN — ATORVASTATIN CALCIUM 20 MG: 10 TABLET, FILM COATED ORAL at 21:46

## 2020-10-12 RX ADMIN — Medication 10 ML: at 21:46

## 2020-10-12 RX ADMIN — ASPIRIN 325 MG: 325 TABLET, FILM COATED ORAL at 17:38

## 2020-10-12 RX ADMIN — DEXTROSE MONOHYDRATE 25 G: 25 INJECTION, SOLUTION INTRAVENOUS at 12:53

## 2020-10-12 ASSESSMENT — PAIN SCALES - GENERAL: PAINLEVEL_OUTOF10: 0

## 2020-10-12 NOTE — ED PROVIDER NOTES
[address] ED  EMERGENCY DEPARTMENT ENCOUNTER        Pt Name: Lisa Yoder  MRN: [id number]  [place] 1940  Date of evaluation: 10/12/2020  Provider: Chelle Abarca PA-C  PCP: Chelsea Zamora MD     I have seen and evaluated this patient with my supervising physician Errol Ramesh [address]       Chief Complaint   Patient presents with    Altered Mental Status     Pt brought in by ems who were called for a c/o seizures. EMS states they did not see any seizures, but blood sugar at scene was 140 and then just prior to getting to hospital blood sugar was taken again and it was 54 and oral glucose given. Pt is confused, does not answer questions appropriately. HISTORY OF PRESENT ILLNESS   (Location, Timing/Onset, Context/Setting, Quality, Duration, Modifying Factors, Severity, Associated Signs and Symptoms)  Note limiting factors. Lisa Yoder is a 79 y.o. male with a past medical history of diabetes, hypertension, hyperlipidemia brought in today by squad for evaluation of altered mental status. Patient is on glipizide and insulin for diabetes. Patient brought in today by squad from home for a possible \"seizure\". Per squad they witnessed no seizure activity. He has no history of seizures. Squad also checked his blood sugar and it was 140 and rechecked it just prior to the hospital and it was 54. He did receive oral glucose. Patient is altered therefore the rest of the history is difficult to obtain. Nursing Notes were all reviewed and agreed with or any disagreements were addressed in the HPI. REVIEW OF SYSTEMS    (2-9 systems for level 4, 10 or more for level 5)     Review of Systems   Unable to perform ROS: Mental status change       Positives and Pertinent negatives as per HPI. Except as noted above in the ROS, all other systems were reviewed and negative.        PAST MEDICAL HISTORY     Past Medical History:   Diagnosis Date    Achilles tendon tear     Acid reflux     Anesthesia     woke up during surery and was combative x1, hsd hsf surgery since and done OK    Calculus of gallbladder with acute on chronic cholecystitis without obstruction 7/18/2019    Diabetes mellitus ([address]     Heart murmur H/O    Hypercholesteremia     Hypertension     Right renal mass 5/16/2013    solid renal mass needing further work up         SURGICAL HISTORY     Past Surgical History:   Procedure Laterality Date    AORTIC VALVE REPLACEMENT      CARDIAC SURGERY      cardiac stents x2    CARDIAC SURGERY  2013    [facility] [address] [place] [address] VALVE SURGERY  2013    Bovine heart valve    CHOLECYSTECTOMY, LAPAROSCOPIC N/A 8/16/2019    LAPAROSCOPIC CHOLECYSTECTOMY WITH CHOLANGIOGRAMS performed by Radha Lopez MD at [place] [address] ERCP N/A 7/22/2019    ERCP SPHINCTER/PAPILLOTOMY performed by Arabella Castro DO at [facility] ERCP  7/22/2019    ERCP STONE REMOVAL performed by Arabella Castro DO at [address] [facility] Left     EYE SURGERY Right 6/4/13    cataract with lens implant    FOOT SURGERY      wound from piece of glass    KIDNEY BIOPSY  2013    OTHER SURGICAL HISTORY  1/30/15    excision of (2) cysts on the back and (1) on neck    OTHER SURGICAL HISTORY Right 03/31/2017    REPAIR ACHILLES TEAR PRIMARY RIGHT FOOT, REPAIR PERONEAL    OTHER SURGICAL HISTORY  08/16/2019    laparoscopic cholecystectomy with cholangiograms         CURRENTMEDICATIONS       Previous Medications    ASPIRIN 81 MG CHEWABLE TABLET    Take 81 mg by mouth daily.  Indications: stopped 01-    GLIPIZIDE (GLUCOTROL) 10 MG TABLET    Take 10 mg by mouth 2 times daily (before meals)     IBUPROFEN (ADVIL;MOTRIN) 800 MG TABLET    Take 1 tablet by mouth every 8 hours as needed for Pain    INSULIN DETEMIR (LEVEMIR) 100 UNIT/ML INJECTION VIAL    Inject 55 Units into the skin 2 times daily     METOPROLOL (LOPRESSOR) 25 MG TABLET    Take by mouth nightly Indications: Take DOS     OMEPRAZOLE (PRILOSEC) 20 MG CAPSULE    Take 40 mg by mouth nightly     ROPINIROLE (REQUIP) 0.5 MG TABLET    Take 1 tablet by mouth nightly 1-2 hours before bedtime    SIMVASTATIN (ZOCOR) 40 MG TABLET    Take 1 tablet by mouth nightly HOLD MEDICATION UNTIL AFTER YOUR FOLLOW UP WITH THE GI SPECIALIST         ALLERGIES     Patient has no known allergies. FAMILYHISTORY       Family History   Problem Relation Age of Onset    Diabetes Mother     Heart Disease Mother     Heart Disease Father           SOCIAL HISTORY       Social History     Tobacco Use    Smoking status: Former Smoker     Packs/day: 1.00     Years: 30.00     Pack years: 30.00     Last attempt to quit: 5/15/1998     Years since quittin.4    Smokeless tobacco: Former User     Quit date: 8/15/1984   Substance Use Topics    Alcohol use: No    Drug use: No       SCREENINGS    [place] Coma Scale  Eye Opening: Spontaneous  Best Verbal Response: Confused  Best Motor Response: Localizes pain  [place] Coma Scale Score: 13        PHYSICAL EXAM    (up to 7 for level 4, 8 or more for level 5)     ED Triage Vitals   BP Temp Temp src Pulse Resp SpO2 Height Weight   -- -- -- -- -- -- -- --       Physical Exam  Vitals signs and nursing note reviewed. Constitutional:       General: He is not in acute distress. Appearance: Normal appearance. He is well-developed and normal weight. He is not ill-appearing, toxic-appearing or diaphoretic. HENT:      Head: Normocephalic and atraumatic. Nose: Nose normal.   Eyes:      General:         Right eye: No discharge. Left eye: No discharge. Neck:      Musculoskeletal: Normal range of motion and neck supple. Cardiovascular:      Pulses:           Radial pulses are 2+ on the right side and 2+ on the left side. Pulmonary:      Effort: Pulmonary effort is normal. No respiratory distress.       Breath sounds: No decreased breath sounds, wheezing, rhonchi or rales. Musculoskeletal: Normal range of motion. General: No deformity. Right lower leg: No edema. Left lower leg: No edema. Skin:     General: Skin is warm and dry. Neurological:      General: No focal deficit present. Mental Status: He is alert. He is disoriented. GCS: GCS eye subscore is 4. GCS verbal subscore is 5. GCS motor subscore is 6. Cranial Nerves: Cranial nerves are intact. Comments: A full neurological exam is difficult to assess as patient unable to follow directions appropriately as patient is altered. Patient only oriented to self. Patient is disoriented. Unable to assess full motor function and full sensation on neuro exam. Unable to assess gait given mental status change. Psychiatric:         Behavior: Behavior normal. Behavior is cooperative.          DIAGNOSTIC RESULTS   LABS:    Labs Reviewed   CBC WITH AUTO DIFFERENTIAL - Abnormal; Notable for the following components:       Result Value    RDW 15.7 (*)     All other components within normal limits    Narrative:     Performed at:  [facility],  [place]   Phone [phone]   COMPREHENSIVE METABOLIC PANEL W/ REFLEX TO MG FOR LOW K - Abnormal; Notable for the following components:    Glucose 49 (*)     CREATININE 1.4 (*)     GFR Non- 49 (*)     GFR  59 (*)     All other components within normal limits    Narrative:     Gloria Barker tel. [id number],  Chemistry results called to and read back by Adan Gunter RN, 10/12/2020  13:49, by Alan Ramos  Performed at:  [facility],  [place], [employer]   Phone [phone]   TROPONIN - Abnormal; Notable for the following components:    Troponin 0.11 (*)     All other components within normal limits    Narrative:     Justino Casey. [id number],  Chemistry results called to and read back by Funmilayo Talavera RN, 10/12/2020  13:49, by Yelitza Oliveira  Performed at:  [facility] [place] [place] [address],  [place], [facility]   Phone [phone]   TROPONIN - Abnormal; Notable for the following components:    Troponin 0.08 (*)     All other components within normal limits    Narrative:     Performed at:  [facility] [place] [place] [address],  [place], [facility]   Phone [phone]   POCT GLUCOSE - Abnormal; Notable for the following components:    POC Glucose 54 (*)     All other components within normal limits    Narrative:     Performed at:  [facility] [place] [place] [address],  [place], [facility]   Phone [phone]   POCT GLUCOSE - Abnormal; Notable for the following components:    POC Glucose 130 (*)     All other components within normal limits    Narrative:     Performed at:  [facility] [place] [place] [address],  [place], [facility]   Phone [phone]   URINE RT REFLEX TO CULTURE   POCT GLUCOSE   POCT GLUCOSE       All other labs were within normal range or not returned as of this dictation. EKG: All EKG's are interpreted by the Emergency Department Physician in the absence of a cardiologist.  Please see their note for interpretation of EKG. RADIOLOGY:   Non-plain film images such as CT, Ultrasound and MRI are read by the radiologist. Plain radiographic images are visualized and preliminarily interpreted by the ED Provider with the below findings:        Interpretation per the Radiologist below, if available at the time of this note:    XR CHEST (2 VW)   Final Result   No acute cardiac or pulmonary disease. CT HEAD WO CONTRAST   Final Result   1. No acute intracranial abnormality. No results found.         PROCEDURES   Unless otherwise noted below, none     Procedures    CRITICAL CARE TIME   N/A    CONSULTS:  IP CONSULT TO CARDIOLOGY  IP CONSULT TO HOSPITALIST      EMERGENCY DEPARTMENT COURSE and DIFFERENTIAL DIAGNOSIS/MDM:   Vitals:    Vitals:    10/12/20 1223 10/12/20 1225 10/12/20 1305 10/12/20 1355   BP: (!) 154/86  (!) 151/62 (!) 128/112   Pulse: 87  88 93   Resp: 16  13 15   Temp:  98 °F (36.7 °C)     TempSrc:  Oral     SpO2: 95%  97% 100%   Weight: 225 lb (102.1 kg)          Patient was given the following medications:  Medications   ondansetron (ZOFRAN) injection 4 mg (4 mg Intravenous Given 10/12/20 1343)   ondansetron (ZOFRAN) 4 MG/2ML injection (has no administration in time range)   aspirin tablet 325 mg (has no administration in time range)   dextrose 50 % IV solution (25 g Intravenous Given 10/12/20 1253)           Patient brought in today by squad from home for evaluation of \"altered mental status\". Per squad he was hypoglycemic and received oral glucose prior to arrival.  He is a known diabetic. Patient is not oriented and therefore unable to give a full history. It is difficult to tell what occurred prior to arrival to the ED. Per EMS they were called for possible \"seizures\". POCT glucose was initially 54. Patient received D50 here in the ED. Repeat POCT glucose of 130. Patient afebrile breathing on room air satting 100%. Nontoxic no acute respiratory distress. Old labs and records reviewed. Patient did receive D50 here in the ER and became more alert and oriented. CBC reveals no acute leukocytosis. Hemoglobin of 16.3. Acute electrolyte abnormalities. Creatinine of 1.4. GFR of 49. Troponin of 0.11 initially. EKG was reviewed at this time and did show some changes in leads V3. Patient denied any CP. I spoke to cardiology and spoke to Dr. Gasper Seip who recommended transfer to [facility]. Repeat troponin of 0.08. Chest x-ray reveals no acute cardiac or pulmonary disease. CT head reveals no acute intracranial abnormality. My attending spoke to the hospitalist at [facility] who did agree to accept this patient. Patient was stable at time of transfer. Patient also received a full dose aspirin while here in the ED. FINAL IMPRESSION      1. Altered mental status, unspecified altered mental status type    2. Elevated troponin    3. Hypoglycemia          DISPOSITION/PLAN   DISPOSITION        PATIENT REFERREDTO:  No follow-up provider specified.     DISCHARGE MEDICATIONS:  New Prescriptions    No medications on file       DISCONTINUED MEDICATIONS:  Discontinued Medications    No medications on file              (Please note that portions of this note were completed with a voice recognition program.  Efforts were made to edit the dictations but occasionally words are mis-transcribed.)    Sarah Angulo PA-C (electronically signed)            Sarah Angulo PA-C  10/12/20 2983

## 2020-10-12 NOTE — ED NOTES
1420- Call placed to Cards for consult     [id number]- Call returned by Dr. Amanda Ribeiro and spoke to Andrea Foley 6  10/12/20 [address]  10/12/20 [address]  10/12/20 2980

## 2020-10-12 NOTE — ED NOTES
Call was placed to transfer center for [facility].     Call was returned by Dr. Virginie Barboza and spoke to Dr. Lex Pisano number [phone]  [phone] report number   [facility] transport to [place] at [phone].          Rich Durbin  10/12/20 8410

## 2020-10-12 NOTE — ED PROVIDER NOTES
I independently examined and evaluated Lisa Yoder. All diagnostic, treatment, and disposition decisions were made by myself in conjunction with the advanced practice provider. For all further details of the patient's emergency department visit, please see the advanced practice provider's documentation. Primary Care Physician: Chelsea Zamora MD    History: This is a 79 y.o. male who presents to the Emergency Department with complaint of altered mental status, patient presents with change in mental status this morning according to spouse. Was brought in by EMS initially did fine patient have episode of hypoglycemia with glucose in the 50s which did improve after dextrose bolus but time patient arrived emergency department still altered per BILLIE report oriented only to self. On my evaluation following second dextrose bolus with improvement of blood sugars greater than 100 patient awake oriented following commands in no acute distress, no obvious mental status deficiencies. Of note patient does have a history of seizure no reported seizure activity. Patient denying chest pain, shortness of breath, abdominal pain. Patient's lab work was reviewed did show elevation in his troponin at 0.11. No obvious ST depressions or elevations noted, does have some Q waves noted however on EKG, BILLIE did discuss case with cardiology who feels patient would be more suited for transfer to [facility] for further work-up and management. Physical:     weight is 225 lb (102.1 kg). His oral temperature is 98 °F (36.7 °C). His blood pressure is 128/112 (abnormal) and his pulse is 93. His respiration is 15 and oxygen saturation is 100%.     79 y.o. male   Alert, oriented  Obese  Heart regular rate and rhythm  Abdomen soft nontender  Lungs are clear to auscultation bilaterally  Neuro oriented, following commands x4 extremities, no gross neuro deficit    Impression: Recurrent hypoglycemia, altered mental status, troponin elevation with EKG change, no STEMI criteria    Plan: Laboratory work-up, admission    EKG Interpretation    The Ekg interpreted by me shows  normal sinus rhythm with a rate of 86  Axis is   Left axis deviation  QTc is  514    ST Segments: nonspecific changes Q waves anterolateral    No significant change from prior EKG dated new anterior lateral Q waves seen compared to EKG on July 17th 2019        CRITICAL CARE: There was a high probability of clinically significant/life threatening deterioration in this patient's condition which required my urgent intervention. Total critical care time was 0 minutes. This excludes any time for separately reportable procedures. Joss Ortega DO  Emergency Physician        Comment: Please note this report has been produced using speech recognition software and may contain errors related to that system including errors in grammar, punctuation, and spelling, as well as words and phrases that may be inappropriate. If there are any questions or concerns please feel free to contact the dictating provider for clarification.            Joss Ortega DO  10/12/20 6266

## 2020-10-13 ENCOUNTER — APPOINTMENT (OUTPATIENT)
Dept: MRI IMAGING | Age: 80
DRG: 100 | End: 2020-10-13
Attending: HOSPITALIST
Payer: MEDICARE

## 2020-10-13 LAB
ANION GAP SERPL CALCULATED.3IONS-SCNC: 8 MMOL/L (ref 3–16)
BASOPHILS ABSOLUTE: 0.1 K/UL (ref 0–0.2)
BASOPHILS RELATIVE PERCENT: 1 %
BUN BLDV-MCNC: 20 MG/DL (ref 7–20)
CALCIUM SERPL-MCNC: 8.8 MG/DL (ref 8.3–10.6)
CHLORIDE BLD-SCNC: 101 MMOL/L (ref 99–110)
CO2: 26 MMOL/L (ref 21–32)
CREAT SERPL-MCNC: 1.5 MG/DL (ref 0.8–1.3)
EOSINOPHILS ABSOLUTE: 0.2 K/UL (ref 0–0.6)
EOSINOPHILS RELATIVE PERCENT: 1.9 %
ESTIMATED AVERAGE GLUCOSE: 157.1 MG/DL
GFR AFRICAN AMERICAN: 55
GFR NON-AFRICAN AMERICAN: 45
GLUCOSE BLD-MCNC: 127 MG/DL (ref 70–99)
GLUCOSE BLD-MCNC: 143 MG/DL (ref 70–99)
GLUCOSE BLD-MCNC: 80 MG/DL (ref 70–99)
GLUCOSE BLD-MCNC: 94 MG/DL (ref 70–99)
GLUCOSE BLD-MCNC: 95 MG/DL (ref 70–99)
HBA1C MFR BLD: 7.1 %
HCT VFR BLD CALC: 44.8 % (ref 40.5–52.5)
HEMOGLOBIN: 15.2 G/DL (ref 13.5–17.5)
LYMPHOCYTES ABSOLUTE: 1.7 K/UL (ref 1–5.1)
LYMPHOCYTES RELATIVE PERCENT: 17.8 %
MAGNESIUM: 1.7 MG/DL (ref 1.8–2.4)
MCH RBC QN AUTO: 31.6 PG (ref 26–34)
MCHC RBC AUTO-ENTMCNC: 34 G/DL (ref 31–36)
MCV RBC AUTO: 92.9 FL (ref 80–100)
MONOCYTES ABSOLUTE: 0.8 K/UL (ref 0–1.3)
MONOCYTES RELATIVE PERCENT: 8.7 %
NEUTROPHILS ABSOLUTE: 6.7 K/UL (ref 1.7–7.7)
NEUTROPHILS RELATIVE PERCENT: 70.6 %
PDW BLD-RTO: 16.2 % (ref 12.4–15.4)
PERFORMED ON: ABNORMAL
PERFORMED ON: NORMAL
PLATELET # BLD: 247 K/UL (ref 135–450)
PMV BLD AUTO: 7.5 FL (ref 5–10.5)
POTASSIUM SERPL-SCNC: 4.6 MMOL/L (ref 3.5–5.1)
RBC # BLD: 4.83 M/UL (ref 4.2–5.9)
SODIUM BLD-SCNC: 135 MMOL/L (ref 136–145)
TROPONIN: 0.11 NG/ML
WBC # BLD: 9.5 K/UL (ref 4–11)

## 2020-10-13 PROCEDURE — G0378 HOSPITAL OBSERVATION PER HR: HCPCS

## 2020-10-13 PROCEDURE — 70551 MRI BRAIN STEM W/O DYE: CPT

## 2020-10-13 PROCEDURE — 2580000003 HC RX 258: Performed by: INTERNAL MEDICINE

## 2020-10-13 PROCEDURE — G0008 ADMIN INFLUENZA VIRUS VAC: HCPCS | Performed by: HOSPITALIST

## 2020-10-13 PROCEDURE — 83036 HEMOGLOBIN GLYCOSYLATED A1C: CPT

## 2020-10-13 PROCEDURE — 6370000000 HC RX 637 (ALT 250 FOR IP): Performed by: INTERNAL MEDICINE

## 2020-10-13 PROCEDURE — 6360000002 HC RX W HCPCS: Performed by: HOSPITALIST

## 2020-10-13 PROCEDURE — 6360000002 HC RX W HCPCS: Performed by: INTERNAL MEDICINE

## 2020-10-13 PROCEDURE — 90686 IIV4 VACC NO PRSV 0.5 ML IM: CPT | Performed by: HOSPITALIST

## 2020-10-13 PROCEDURE — 80048 BASIC METABOLIC PNL TOTAL CA: CPT

## 2020-10-13 PROCEDURE — 1200000000 HC SEMI PRIVATE

## 2020-10-13 PROCEDURE — 99223 1ST HOSP IP/OBS HIGH 75: CPT | Performed by: PSYCHIATRY & NEUROLOGY

## 2020-10-13 PROCEDURE — 96372 THER/PROPH/DIAG INJ SC/IM: CPT

## 2020-10-13 PROCEDURE — 85025 COMPLETE CBC W/AUTO DIFF WBC: CPT

## 2020-10-13 PROCEDURE — 83735 ASSAY OF MAGNESIUM: CPT

## 2020-10-13 PROCEDURE — 36415 COLL VENOUS BLD VENIPUNCTURE: CPT

## 2020-10-13 RX ADMIN — METOPROLOL TARTRATE 25 MG: 25 TABLET, FILM COATED ORAL at 19:58

## 2020-10-13 RX ADMIN — PANTOPRAZOLE SODIUM 40 MG: 40 TABLET, DELAYED RELEASE ORAL at 06:04

## 2020-10-13 RX ADMIN — Medication 10 ML: at 08:50

## 2020-10-13 RX ADMIN — Medication 10 ML: at 19:58

## 2020-10-13 RX ADMIN — ENOXAPARIN SODIUM 40 MG: 40 INJECTION SUBCUTANEOUS at 08:49

## 2020-10-13 RX ADMIN — ROPINIROLE HYDROCHLORIDE 0.5 MG: 0.5 TABLET, FILM COATED ORAL at 19:58

## 2020-10-13 RX ADMIN — ASPIRIN 81 MG: 81 TABLET, CHEWABLE ORAL at 08:49

## 2020-10-13 RX ADMIN — INFLUENZA A VIRUS A/VICTORIA/2454/2019 IVR-207 (H1N1) ANTIGEN (PROPIOLACTONE INACTIVATED), INFLUENZA A VIRUS A/HONG KONG/2671/2019 IVR-208 (H3N2) ANTIGEN (PROPIOLACTONE INACTIVATED), INFLUENZA B VIRUS B/VICTORIA/705/2018 BVR-11 ANTIGEN (PROPIOLACTONE INACTIVATED), INFLUENZA B VIRUS B/PHUKET/3073/2013 BVR-1B ANTIGEN (PROPIOLACTONE INACTIVATED) 0.5 ML: 15; 15; 15; 15 INJECTION, SUSPENSION INTRAMUSCULAR at 08:49

## 2020-10-13 RX ADMIN — ATORVASTATIN CALCIUM 20 MG: 10 TABLET, FILM COATED ORAL at 08:49

## 2020-10-13 RX ADMIN — METOPROLOL TARTRATE 25 MG: 25 TABLET, FILM COATED ORAL at 08:49

## 2020-10-13 ASSESSMENT — PAIN SCALES - GENERAL: PAINLEVEL_OUTOF10: 0

## 2020-10-13 NOTE — CONSULTS
In patient Neurology consult        [facility] Neurology      MD Forrest Encarnacion  1940    Date of Service: 10/13/2020    Referring Physician: Tiny Brooks MD      Reason for the consult and CC: Acute encephalopathy and recurrent confusion. HPI:   The patient is a 79y.o.  years old male with history of hypertension, hyperlipidemia, diabetes and CAD who was admitted to the hospital yesterday from [facility] with acute confusion and encephalopathy. According to report, symptoms started few days ago. Family describe recurrent spells at home with the confusion, not following direction and abnormal behavior. Worse during early morning. Description was \"entire body shaking, loud shouting and confusion disorientation for several minutes\". During that time, the patient is not coherent and not following direction. Other associated symptoms include some headaches for few minutes. No tongue biting or bladder incontinence. No clear triggers. Degree is severe. No other relieving or aggravating factors. He had several episodes at home and family decided to bring him to the ER. His sugar was low on initial evaluation by paramedics. He came to [facility] ED for evaluation  CT head showed no acute findings. Metabolic testing and showed sodium 135 and creatinine of 1.5. He was admitted to [facility]. The patient is currently by himself. He is not a good historian. He denies any headache or chest pain or dysphagia or dysarthria. Other review of system was limited from the patient.       Family History   Problem Relation Age of Onset    Diabetes Mother     Heart Disease Mother     Heart Disease Father      Past Surgical History:   Procedure Laterality Date    AORTIC VALVE REPLACEMENT      CARDIAC SURGERY      cardiac stents x2   [facility] CARDIAC SURGERY  2013    [facility] [address] [place] [address] VALVE SURGERY  2013    Bovine heart valve    CHOLECYSTECTOMY, LAPAROSCOPIC N/A 2019    LAPAROSCOPIC CHOLECYSTECTOMY WITH CHOLANGIOGRAMS performed by Azalia Reddy MD at [place] [address] ERCP N/A 2019    ERCP SPHINCTER/PAPILLOTOMY performed by Mina Moreno DO at [address] [facility] ERCP  2019    ERCP STONE REMOVAL performed by Mina Moreno DO at [address] [place] [address] Left     EYE SURGERY Right 13    cataract with lens implant    FOOT SURGERY      wound from piece of glass    KIDNEY BIOPSY      OTHER SURGICAL HISTORY  1/30/15    excision of (2) cysts on the back and (1) on neck    OTHER SURGICAL HISTORY Right 2017    REPAIR ACHILLES TEAR PRIMARY RIGHT FOOT, REPAIR PERONEAL    OTHER SURGICAL HISTORY  2019    laparoscopic cholecystectomy with cholangiograms        Past Medical History:   Diagnosis Date    Achilles tendon tear     Acid reflux     Anesthesia     woke up during surery and was combative x1, hsd hsf surgery since and done OK    CAD S/P percutaneous coronary angioplasty 10/12/2020    Calculus of gallbladder with acute on chronic cholecystitis without obstruction 2019    Diabetes mellitus ([address]     Heart murmur H/O    Hypercholesteremia     Hypertension     Right renal mass 2013    solid renal mass needing further work up     Social History     Tobacco Use    Smoking status: Former Smoker     Packs/day: 1.00     Years: 30.00     Pack years: 30.00     Last attempt to quit: 5/15/1998     Years since quittin.4    Smokeless tobacco: Former User     Quit date: 8/15/1984   Substance Use Topics    Alcohol use: No    Drug use: No     No Known Allergies  Current Facility-Administered Medications   Medication Dose Route Frequency Provider Last Rate Last Dose    sodium chloride flush 0.9 % injection 10 mL  10 mL Intravenous PRN Ladi Davila MD   10 mL at 10/13/20 0850    potassium chloride (KLOR-CON M) extended release tablet 40 mEq  40 mEq Oral PRN Thor Chung MD        Or    potassium bicarb-citric acid (EFFER-K) effervescent tablet 40 mEq  40 mEq Oral PRN Thor Chung MD        Or    potassium chloride 10 mEq/100 mL IVPB (Peripheral Line)  10 mEq Intravenous PRN Thor Chung MD        magnesium sulfate 1 g in dextrose 5% 100 mL IVPB  1 g Intravenous PRN Thor Chung MD        acetaminophen (TYLENOL) tablet 650 mg  650 mg Oral Q6H PRN Thor Chung MD        Or    acetaminophen (TYLENOL) suppository 650 mg  650 mg Rectal Q6H PRN Thor Chung MD        promethazine (PHENERGAN) tablet 12.5 mg  12.5 mg Oral Q6H PRN Thor Chung MD        Or    ondansetron [facility]) injection 4 mg  4 mg Intravenous Q6H PRN Thor Chung MD        enoxaparin (LOVENOX) injection 40 mg  40 mg Subcutaneous Daily Thor Chung MD   40 mg at 10/13/20 0849    melatonin ER tablet 2 mg  2 mg Oral Nightly PRN Thor Chung MD        aspirin chewable tablet 81 mg  81 mg Oral Daily Thor Chung MD   81 mg at 10/13/20 0849    metoprolol tartrate (LOPRESSOR) tablet 25 mg  25 mg Oral BID Thor Chung MD   25 mg at 10/13/20 0849    pantoprazole (PROTONIX) tablet 40 mg  40 mg Oral QAM AC Thor Chung MD   40 mg at 10/13/20 0604    rOPINIRole (REQUIP) tablet 0.5 mg  0.5 mg Oral Nightly Thor Chung MD   0.5 mg at 10/12/20 2146    atorvastatin (LIPITOR) tablet 20 mg  20 mg Oral Daily Thor Chung MD   20 mg at 10/13/20 0849    LORazepam (ATIVAN) injection 4 mg  4 mg Intravenous PRN Thor Chung MD        glucose (GLUTOSE) 40 % oral gel 15 g  15 g Oral PRN Thor Chung MD        dextrose 50 % IV solution  12.5 g Intravenous PRN Thor Chung MD        glucagon (rDNA) injection 1 mg  1 mg Intramuscular PRN Thor Chung MD        dextrose 5 % solution  100 mL/hr Intravenous PRN Thor Chung MD        insulin lispro (HUMALOG) injection vial 0-6 Units  0-6 Units Subcutaneous TID WC Jose Melchor MD        insulin lispro (HUMALOG) injection vial 0-3 Units  0-3 Units Subcutaneous Nightly Jose Melchor MD           ROS : A 10-14 system review of constitutional, cardiovascular, respiratory, eyes, musculoskeletal, endocrine, GI, ENT, skin, hematological, genitourinary, psychiatric and neurologic systems was obtained and updated today and is unremarkable except as mentioned in my HPI      Exam:     Constitutional:   Vitals:    10/12/20 2130 10/12/20 2300 10/13/20 0848 10/13/20 1031   BP:  111/69 101/64    Pulse:  85 76    Resp:  14 16    Temp:  98.5 °F (36.9 °C) 98 °F (36.7 °C)    TempSrc:  Oral Oral    SpO2:  95% 95%    Weight: 225 lb (102.1 kg)      Height: 5' 10.5\" (1.791 m)   5' 10.5\" (1.791 m)       General appearance:  Normal development and appear in no acute distress. Eye: No icterus. Fundus: Funduscopic examination cannot be performed due to COVID19 restrictions and precautions. Neck: supple  Cardiovascular: No lower leg edema with good pulsation. Mental Status:   AAO x3  Recent memory is impaired  Intact remote memory  Poor fund of knowledge  Language is fluent but with poor vocabulary  Good attention and concentration but easily distracted. Cranial Nerves:   II: Visual fields: Full. Pupils: equal, round, reactive to light  III,IV,VI: Extra Ocular Movements are intact. No nystagmus  V: Facial sensation is intact   VII: Facial strength and movements: intact and symmetric  VIII: Hearing: Intact to finger rub bilaterally  IX: Palate elevation is symmetric  XI: Shoulder shrug is intact  XII: Tongue movements are normal  Musculoskeletal: 5/5 in all 4 extremities. Tone: Normal tone. Reflexes: 2+ in the arms 1+ in the leg  Planters: flexor bilaterally. Coordination: no pronator drift, right dysmetria with FNF in upper extremities. Normal REM. Sensation: normal to all modalities in both arms and legs.   Gait/Posture: Not tested due to poor cooperation for the patient. Data:  LABS:   Lab Results   Component Value Date     10/13/2020    K 4.6 10/13/2020    K 4.8 10/12/2020     10/13/2020    CO2 26 10/13/2020    BUN 20 10/13/2020    CREATININE 1.5 10/13/2020    GFRAA 55 10/13/2020    GFRAA >60 05/17/2013    LABGLOM 45 10/13/2020    GLUCOSE 127 10/13/2020    MG 1.70 10/13/2020    CALCIUM 8.8 10/13/2020     Lab Results   Component Value Date    WBC 9.5 10/13/2020    RBC 4.83 10/13/2020    HGB 15.2 10/13/2020    HCT 44.8 10/13/2020    MCV 92.9 10/13/2020    RDW 16.2 10/13/2020     10/13/2020     Lab Results   Component Value Date    INR 1.18 (H) 07/22/2019    PROTIME 13.4 (H) 07/22/2019       Neuroimaging were independently reviewed by myself and discussed results with the patient  Reviewed notes from different physicians  Reviewed lab and blood testing    Impression:  Acute encephalopathy, severe. So far no specific etiology. Rule out new ischemic stroke, metabolic or even new onset seizure. Hypertension  Hyponatremia  Diabetes, not controlled with polyneuropathy  Hyperlipidemia  Acute kidney injury  Elevated troponin      Recommendation:  MRI brain  EEG  Insulin sliding scale  Blood sugar monitor and control  Speech and swallow evaluation  Telemetry  PT OT  Hydration  Recheck electrolytes, creatinine and sodium  DVT and GI prophylaxis  Blood pressure monitoring and continue home blood pressure medications  Avoid blood pressure below 120/80  Continue aspirin for now  Statin  Follow lipid panel  Stroke prevention was discussed with the patient  Replace magnesium  If MRI showed new stroke, he will need carotid Doppler. Will follow. Thank you for referring such patient. If you have any questions regarding my consult note, please don't hesitate to call me. Flavia Todd MD  [phone]    This dictation was generated by voice recognition computer software.  Although all attempts are made to edit the dictation for accuracy, there may be errors in the  transcription that are not intended

## 2020-10-13 NOTE — H&P
systems were reviewed and are negative. Past Medical History:   Diagnosis Date    Achilles tendon tear     Acid reflux     Anesthesia     woke up during surery and was combative x1, hsd hsf surgery since and done OK    CAD S/P percutaneous coronary angioplasty 10/12/2020    Calculus of gallbladder with acute on chronic cholecystitis without obstruction 7/18/2019    Diabetes mellitus ([facility]     Heart murmur H/O    Hypercholesteremia     Hypertension     Right renal mass 5/16/2013    solid renal mass needing further work up       Past Surgical History:   Procedure Laterality Date    AORTIC VALVE REPLACEMENT      CARDIAC SURGERY      cardiac stents x2    CARDIAC SURGERY  2013    [facility] [address] [place] [address] VALVE SURGERY  2013    Bovine heart valve    CHOLECYSTECTOMY, LAPAROSCOPIC N/A 8/16/2019    LAPAROSCOPIC CHOLECYSTECTOMY WITH CHOLANGIOGRAMS performed by Nina Flores MD at [place] [address] ERCP N/A 7/22/2019    ERCP SPHINCTER/PAPILLOTOMY performed by Yeimi Jennings DO at [address] ERCP  7/22/2019    ERCP STONE REMOVAL performed by Yeimi Jennings DO at [address] [facility] Left     EYE SURGERY Right 6/4/13    cataract with lens implant    FOOT SURGERY      wound from piece of glass    KIDNEY BIOPSY  2013    OTHER SURGICAL HISTORY  1/30/15    excision of (2) cysts on the back and (1) on neck    OTHER SURGICAL HISTORY Right 03/31/2017    REPAIR ACHILLES TEAR PRIMARY RIGHT FOOT, REPAIR PERONEAL    OTHER SURGICAL HISTORY  08/16/2019    laparoscopic cholecystectomy with cholangiograms         Prior to Admission medications    Medication Sig Start Date End Date Taking?  Authorizing Provider   simvastatin (ZOCOR) 40 MG tablet Take 1 tablet by mouth nightly HOLD MEDICATION UNTIL AFTER YOUR FOLLOW UP WITH THE GI SPECIALIST 7/22/19   HEMA Borges   rOPINIRole (REQUIP) 0.5 MG tablet Take 1 tablet by mouth nightly 1-2 hours before bedtime 9/12/18   Melo Armijo MD   ibuprofen (ADVIL;MOTRIN) 800 MG tablet Take 1 tablet by mouth every 8 hours as needed for Pain 3/31/17   Candace Kerns DPM   omeprazole (PRILOSEC) 20 MG capsule Take 40 mg by mouth nightly     Historical Provider, MD   insulin detemir (LEVEMIR) 100 UNIT/ML injection vial Inject 55 Units into the skin 2 times daily     Historical Provider, MD   metoprolol (LOPRESSOR) 25 MG tablet Take by mouth nightly Indications: Take DOS     Historical Provider, MD   aspirin 81 MG chewable tablet Take 81 mg by mouth daily. Indications: stopped 01-    Historical Provider, MD       Allergies:   Patient has no known allergies. Social:   reports that he quit smoking about 22 years ago. He has a 30.00 pack-year smoking history. He quit smokeless tobacco use about 36 years ago. reports no history of alcohol use. Social History     Substance and Sexual Activity   Drug Use No       Family History   Problem Relation Age of Onset    Diabetes Mother     Heart Disease Mother     Heart Disease Father        PHYSICAL EXAM:  I performed this physical examination. Vitals:  Patient Vitals for the past 24 hrs:   BP Temp Temp src Pulse Resp SpO2 Height Weight   10/12/20 2300 111/69 98.5 °F (36.9 °C) Oral 85 14 95 % -- --   10/12/20 2130 -- -- -- -- -- -- 5' 10.5\" (1.791 m) 225 lb (102.1 kg)   10/12/20 1952 111/71 98 °F (36.7 °C) Oral 88 16 98 % -- --     Room air    GEN:  Appearance:  Elderly male . Level of Consciousness:  Alert . Orientation:  Self, \"a hospital\", and able to state current president. HEENT: Sclera anicteric.  no conjunctival chemosis. moist mucus membranes. no specific or diagnostic oral lesions. NECK:  no signs of meningismus. Jugular veins not distended. Carotid pulses  2+.  no cervical lymphadenopathy. no thyromegaly. CV:  regular rhythm. normal S1 & S2.    no murmur. no rub.  no gallop.     PULM:  Chest excursion is symmetric. Breath sounds are vesicular. Adventitious sounds:  none    AB:  Abdominal shape is normal.  Bowel sounds are active. Generally soft to palpation. no tenderness is present. no involuntary guarding. no rebound guarding. EXTR:  Skin is warm. Capillary refill brisk. no specific or pathognomic rash. no clubbing. no pitting edema. no active wound or ulcer. NEURO: Cranial nerves 2-12 intact . Muscle tone:  normal.  Strength: 5/5 x 4. Pronator drift: absent (patient is right handed). Sensation:  Severely reduced pedal sensation bilaterally. Deep tendon reflexes:  2+. Rhomberg sign:  negative. Babinski sign:  negative. LABS:  Lab Results   Component Value Date    WBC 9.2 10/12/2020    HGB 16.3 10/12/2020    HCT 48.1 10/12/2020    MCV 91.9 10/12/2020     10/12/2020     Lab Results   Component Value Date    CREATININE 1.4 (H) 10/12/2020    BUN 20 10/12/2020     10/12/2020    K 4.8 10/12/2020     10/12/2020    CO2 21 10/12/2020     Lab Results   Component Value Date    ALT 16 10/12/2020    AST 27 10/12/2020    ALKPHOS 111 10/12/2020    BILITOT 0.5 10/12/2020     Lab Results   Component Value Date    TROPONINI 0.08 (H) 10/12/2020     No results for input(s): PHART, WJN7RFT, PO2ART in the last 72 hours. IMAGING:  Xr Chest (2 Vw)    Result Date: 10/12/2020  EXAMINATION: TWO XRAY VIEWS OF THE CHEST 10/12/2020 1:34 pm COMPARISON: Prior study(s) most recent 09/03/2015. HISTORY: ORDERING SYSTEM PROVIDED HISTORY: AMS TECHNOLOGIST PROVIDED HISTORY: Reason for exam:->AMS Reason for Exam: ams Acuity: Unknown Type of Exam: Unknown FINDINGS: The heart is borderline to mildly enlarged but unchanged. No acute airspace disease. Prior sternotomy, valve replacement and atrial clip procedure evident. No pneumothorax or pleural effusion. No change from prior study. No acute cardiac or pulmonary disease.      Ct Head Wo Contrast    Result Date: 10/12/2020  EXAMINATION: CT OF THE HEAD WITHOUT CONTRAST  10/12/2020 1:27 pm TECHNIQUE: CT of the head was performed without the administration of intravenous contrast. Dose modulation, iterative reconstruction, and/or weight based adjustment of the mA/kV was utilized to reduce the radiation dose to as low as reasonably achievable. COMPARISON: None. HISTORY: ORDERING SYSTEM PROVIDED HISTORY: AMS TECHNOLOGIST PROVIDED HISTORY: If patient is on cardiac monitor and/or pulse ox, they may be taken off cardiac monitor and pulse ox, left on O2 if currently on. All monitors reattached when patient returns to room. Has a \"code stroke\" or \"stroke alert\" been called? ->No Reason for exam:->AMS Reason for Exam: AMS Acuity: Unknown Type of Exam: Unknown FINDINGS: BRAIN/VENTRICLES: There is no acute intracerebral hemorrhage or extra-axial fluid collection. There is moderate cerebral atrophy with mild and subcortical periventricular white matter small vessel ischemic disease. There is an age-indeterminate lacunar infarct involving the left basal ganglia, likely old. ORBITS: Status post bilateral cataract removal. SINUSES: The visualized paranasal sinuses and mastoid air cells are clear. SOFT TISSUES/SKULL:  The calvarium is intact. 1. No acute intracranial abnormality. I directly reviewed all recent imaging studies as well as pertinent prior studies. EKG:  New and pertinent prior tracings were directly reviewed.   My interpretation is as follows:  NSR w/ RBBB and LAFB    Active Hospital Problems    Diagnosis Date Noted    AMS (altered mental status) [R41.82] 10/12/2020    History of aortic valve replacement with bioprosthetic valve [Z95.3] 10/12/2020    ROOSEVELT (obstructive sleep apnea) [G47.33] 10/12/2020    CAD S/P percutaneous coronary angioplasty [I25.10, Z98.61] 10/12/2020    S/P left atrial appendage ligation [Z98.890] 10/12/2020    H/O partial nephrectomy [Z90.5] 10/12/2020    Seizure-like activity ([facility] [address] [R56.9] 10/12/2020    Type 2 diabetes mellitus with hypoglycemia, without long-term current use of insulin (HCC) [E11.649]     Hypertension [I10]        ASSESSMENT & PLAN  Seizure-Like Activity  -  Description of events as episodic involving widespread involuntary movement and loud guttural vocalization followed by headache and disorientation is suggestive of a seizure and post-ictal period. Will proceed first with MRI of the brain. Patient describes problems tolerating MRI's in the past.  He has become anxious and sometimes frankly combative. He'll require sedation. 5mg haldol po will be ordered an hour prior to the study.  -  Seizure precautions and enacted and IV lorazepam made available to abort seizures should any occur.  -  Will request input from the neurology service. DM 2 w/ hypoglycemia  -  Euglycemic currently. Patient is prescribed high dose detemir insulin at home (70 U qam and 50 U qhs) but his compliance is uncertain.  -  Monitor FSBS q4h for now and low lispro sliding scale available. - If glucose reliably up-trends will resume full insulin regimen (long and short acting) and titrate as needed. CAD s/p PCI, s/p bioAVR, elevated troponin  -  Continue ASA, statin, metoprolol.  -  Trend troponin which so far is down-trending c/w type 2 MI from supply-demand mismatch. ROOSEVELT, RLS  -  Noncompliant with CPAP. -  Continue home ropinirole. DVT prophylaxis: SCDs, lovenox  Code Status:  Full  Disposition:  Inpatient. Anticipated d/c to home in 2-3 days.     Carlyn Bray MD

## 2020-10-13 NOTE — PLAN OF CARE
Nutrition Problem #1: Increased nutrient needs  Intervention: Food and/or Nutrient Delivery: Continue Current Diet  Nutritional Goals: Consume >75% of meals

## 2020-10-13 NOTE — PROGRESS NOTES
Comprehensive Nutrition Assessment    Type and Reason for Visit:  Initial, Wound    Nutrition Recommendations/Plan:   1. Continue current Carb Control diet  2. Encourage protein intake  2. Monitor nutrition adequacy, pertinent labs, bowel habits, wt changes, and clinical progress    Nutrition Assessment:  Pt admitted with altered mental status. History of DM, HTN, and hyperlipidemia. Pt appeared confused at time of visit. Pt asked if there was a person in the bed next to him even though there was no other bed. Pt reports good appetite at home and \"eats what is in front of him\". Pt reports not following any carb control diet. Pt reports no weight loss. Discussed importance of protein intake for wound healing. Pt reports consuming good sources of protein like meat. Will monitor for further nutrition intervention. Malnutrition Assessment:  Malnutrition Status: At risk for malnutrition (Comment)      Estimated Daily Nutrient Needs:  Energy (kcal):  2776-1160 kcals/day; Weight Used for Energy Requirements:  Ideal(76.8 kg)     Protein (g):   g/day; Weight Used for Protein Requirements:  Ideal(1.3-1.5 g/kg)        Fluid (ml/day):  1 ml/kcal; Weight Used for Fluid Requirements:  [place]      Nutrition Related Findings:  good appetite, wound      Wounds:  (non-healing abrasion)       Current Nutrition Therapies:    DIET CARB CONTROL; Anthropometric Measures:  · Height: 5' 10.5\" (179.1 cm)  · Current Body Weight: 225 lb (102.1 kg)    · Ideal Body Weight: 169 lbs; % Ideal Body Weight 133.1 %   · BMI: 31.8  · Adjusted Body Weight:  ; No Adjustment    · BMI Categories: Obese Class 1 (BMI 30.0-34. 9)       Nutrition Diagnosis:   · Increased nutrient needs related to increase demand for energy/nutrients as evidenced by wounds      Nutrition Interventions:   Food and/or Nutrient Delivery:  Continue Current Diet  Nutrition Education/Counseling:  Education initiated   Coordination of Nutrition Care:  Continued Inpatient Monitoring    Goals:  Consume >75% of meals       Nutrition Monitoring and Evaluation:       Food/Nutrient Intake Outcomes:  Food and Nutrient Intake  Physical Signs/Symptoms Outcomes:  Biochemical Data     Discharge Planning:     Too soon to determine     Electronically signed by Ashley Stuart, Dietetics student on 10/13/20 at 10:45 AM EDT    Contact: [phone]

## 2020-10-13 NOTE — PROGRESS NOTES
Pt refusing bed alarm and stated when he needs to go to the bathroom its an emergency and he cant wait. RN educated on the importance of calling for assistance and pt stated \"hang been walking longer than you've been alive, Ill be fine\". Rn will continue to monitor.

## 2020-10-13 NOTE — PROGRESS NOTES
4 Eyes Skin Assessment     The patient is being assess for   Admission    I agree that 2 RN's have performed a thorough Head to Toe Skin Assessment on the patient. ALL assessment sites listed below have been assessed. Areas assessed by both nurses:   [x]   Head, Face, and Ears   [x]   Shoulders, Back, and Chest, Abdomen  [x]   Arms, Elbows, and Hands   [x]   Coccyx, Sacrum, and Ischium  [x]   Legs, Feet, and Heels        Pt has scattered scabs and an abrasion to left forearm, covered with a nonadherent dressing    **SHARE this note so that the co-signing nurse is able to place an eSignature**    Co-signer eSignature: Electronically signed by Kelly Petersen RN on 10/12/20 at 11:10 PM EDT    Does the Patient have Skin Breakdown?   No          Thony Prevention initiated:  No   Wound Care Orders initiated:  No      C nurse consulted for Pressure Injury (Stage 3,4, Unstageable, DTI, NWPT, Complex wounds)and New or Established Ostomies:  No      Primary Nurse eSignature: Electronically signed by Chandu Lugo RN on 10/12/20 at 9:12 PM EDT

## 2020-10-13 NOTE — PROGRESS NOTES
Hospitalist Progress Note    Date of Admission: 10/12/2020    Chief Complaint: [address] Course:   Kathie Cooper is a 79 y.o. male. He was accepted in transfer from [facility] ER. He presented from home via ambulance. EMS was activated by his spouse. History provided by the patient's spouse, Erika Rollins, who related patient had on three separate occasions in the past week begun to suddenly flail and shout. The first episode occurred 6 days ago, the second 5 days ago, and the third this morning. One spell occurred in bed and patient wound up between his bed and a nearby wall. His spouse had great difficulty getting him out (she is 80years old). All spells have occurred in the morning. Spells are more specifically described as involuntary appearing \"flailing\" motions involving patient entire body, loud shouting, and all have been followed by disorientation and complaints of headdache. His spouse related \"he looked at me like he didn't know me. \"  He has clenched his head in his hands and reported a headache after each episode. He does have some degree of chronic memory loss at baseline. At baseline he has difficulty reporting the current date and sometimes his current location. Patient is able to confirm the above history. He does not seem to be completely amnestic regarding these events. He is unable to relate any other detail of value though. Spouse relates she activated EMS today because of today's spell and \"Things can't go on like this. \"  When medics arrived patient was not \"flailing\" or demonstrating any other abnormal movement. They did check his FSBS twice though and he was hypoglycemic the second time. He was treated with IV D50 and thus far his hypoglycemia has not recurred. Subjective: Alert and oriented, although has been somewhat confrontational with staff. No seizure like activity has been noted.       Labs:   Recent Labs     10/12/20  1245 10/13/20  0834   WBC 9.2 9.5   HGB 16.3 15.2   HCT 48.1 44.8    247     Recent Labs     10/12/20  1245 10/13/20  0834    135*   K 4.8 4.6    101   CO2 21 26   BUN 20 20   CREATININE 1.4* 1.5*   CALCIUM 9.2 8.8     Recent Labs     10/12/20  1245   AST 27   ALT 16   BILITOT 0.5   ALKPHOS 111     No results for input(s): INR in the last 72 hours. Physical Exam Performed:    /71   Pulse 66   Temp 97.7 °F (36.5 °C) (Oral)   Resp 16   Ht 5' 10.5\" (1.791 m)   Wt 225 lb (102.1 kg)   SpO2 98%   BMI 31.83 kg/m²     General appearance: No apparent distress, appears stated age and cooperative. HEENT: Pupils equal, round, and reactive to light. Conjunctivae/corneas clear. Neck: Supple, no jugular venous distention. Trachea midline with full range of motion. Respiratory:  Normal respiratory effort. Clear to auscultation, bilaterally without Rales/Wheezes/Rhonchi. Cardiovascular: Regular rate and rhythm with normal S1/S2 without murmurs, rubs or gallops. Abdomen: Soft, non-tender, non-distended with normal bowel sounds. Musculoskelatal: No clubbing, cyanosis or edema bilaterally. Full range of motion without deformity. Neurologic:  Neurovascularly intact without any focal sensory/motor deficits. Cranial nerves: II-XII intact, grossly non-focal.  Psychiatric: Alert and oriented, limited insight  Skin: Skin color, texture, turgor normal.  No rashes or lesions.   Capillary Refill: Brisk,< 3 seconds   Peripheral Pulses: +2 palpable, equal bilaterally       Assessment/Plan:    Active Hospital Problems    Diagnosis    Acute encephalopathy [G93.40]    Cerebrovascular accident (CVA) ([facility] [I63.9]    DM (diabetes mellitus), secondary, uncontrolled, w/neurologic complic ([facility] [E12.84, Q84.68]    Hyponatremia [E87.1]    AMS (altered mental status) [R41.82]    History of aortic valve replacement with bioprosthetic valve [Z95.3]    ROOSEVELT (obstructive sleep apnea) [G47.33]    CAD S/P percutaneous coronary angioplasty

## 2020-10-14 VITALS
OXYGEN SATURATION: 99 % | WEIGHT: 225.7 LBS | TEMPERATURE: 97.9 F | SYSTOLIC BLOOD PRESSURE: 115 MMHG | BODY MASS INDEX: 31.6 KG/M2 | RESPIRATION RATE: 16 BRPM | HEART RATE: 57 BPM | HEIGHT: 71 IN | DIASTOLIC BLOOD PRESSURE: 74 MMHG

## 2020-10-14 PROBLEM — E11.22 CKD STAGE 3 DUE TO TYPE 2 DIABETES MELLITUS (HCC): Status: ACTIVE | Noted: 2020-10-14

## 2020-10-14 PROBLEM — N17.9 AKI (ACUTE KIDNEY INJURY) (HCC): Status: ACTIVE | Noted: 2020-10-14

## 2020-10-14 PROBLEM — N18.30 CKD STAGE 3 DUE TO TYPE 2 DIABETES MELLITUS (HCC): Status: ACTIVE | Noted: 2020-10-14

## 2020-10-14 LAB
ANION GAP SERPL CALCULATED.3IONS-SCNC: 8 MMOL/L (ref 3–16)
BASOPHILS ABSOLUTE: 0.1 K/UL (ref 0–0.2)
BASOPHILS RELATIVE PERCENT: 0.9 %
BUN BLDV-MCNC: 25 MG/DL (ref 7–20)
CALCIUM SERPL-MCNC: 9 MG/DL (ref 8.3–10.6)
CHLORIDE BLD-SCNC: 102 MMOL/L (ref 99–110)
CO2: 27 MMOL/L (ref 21–32)
CREAT SERPL-MCNC: 1.7 MG/DL (ref 0.8–1.3)
EOSINOPHILS ABSOLUTE: 0.3 K/UL (ref 0–0.6)
EOSINOPHILS RELATIVE PERCENT: 3.4 %
GFR AFRICAN AMERICAN: 47
GFR NON-AFRICAN AMERICAN: 39
GLUCOSE BLD-MCNC: 119 MG/DL (ref 70–99)
GLUCOSE BLD-MCNC: 122 MG/DL (ref 70–99)
GLUCOSE BLD-MCNC: 132 MG/DL (ref 70–99)
GLUCOSE BLD-MCNC: 136 MG/DL (ref 70–99)
GLUCOSE BLD-MCNC: 151 MG/DL (ref 70–99)
HCT VFR BLD CALC: 44.5 % (ref 40.5–52.5)
HEMOGLOBIN: 15.1 G/DL (ref 13.5–17.5)
LYMPHOCYTES ABSOLUTE: 1.8 K/UL (ref 1–5.1)
LYMPHOCYTES RELATIVE PERCENT: 23.1 %
MAGNESIUM: 1.8 MG/DL (ref 1.8–2.4)
MCH RBC QN AUTO: 31.4 PG (ref 26–34)
MCHC RBC AUTO-ENTMCNC: 34 G/DL (ref 31–36)
MCV RBC AUTO: 92.4 FL (ref 80–100)
MONOCYTES ABSOLUTE: 0.9 K/UL (ref 0–1.3)
MONOCYTES RELATIVE PERCENT: 11 %
NEUTROPHILS ABSOLUTE: 4.9 K/UL (ref 1.7–7.7)
NEUTROPHILS RELATIVE PERCENT: 61.6 %
PDW BLD-RTO: 15.6 % (ref 12.4–15.4)
PERFORMED ON: ABNORMAL
PLATELET # BLD: 213 K/UL (ref 135–450)
PMV BLD AUTO: 7.8 FL (ref 5–10.5)
POTASSIUM SERPL-SCNC: 5.1 MMOL/L (ref 3.5–5.1)
RBC # BLD: 4.81 M/UL (ref 4.2–5.9)
SODIUM BLD-SCNC: 137 MMOL/L (ref 136–145)
WBC # BLD: 8 K/UL (ref 4–11)

## 2020-10-14 PROCEDURE — G0378 HOSPITAL OBSERVATION PER HR: HCPCS

## 2020-10-14 PROCEDURE — 85025 COMPLETE CBC W/AUTO DIFF WBC: CPT

## 2020-10-14 PROCEDURE — 95816 EEG AWAKE AND DROWSY: CPT | Performed by: PSYCHIATRY & NEUROLOGY

## 2020-10-14 PROCEDURE — 95819 EEG AWAKE AND ASLEEP: CPT

## 2020-10-14 PROCEDURE — 80048 BASIC METABOLIC PNL TOTAL CA: CPT

## 2020-10-14 PROCEDURE — 6360000002 HC RX W HCPCS: Performed by: INTERNAL MEDICINE

## 2020-10-14 PROCEDURE — 6370000000 HC RX 637 (ALT 250 FOR IP): Performed by: INTERNAL MEDICINE

## 2020-10-14 PROCEDURE — 83735 ASSAY OF MAGNESIUM: CPT

## 2020-10-14 PROCEDURE — 36415 COLL VENOUS BLD VENIPUNCTURE: CPT

## 2020-10-14 PROCEDURE — 99232 SBSQ HOSP IP/OBS MODERATE 35: CPT | Performed by: PSYCHIATRY & NEUROLOGY

## 2020-10-14 PROCEDURE — 96372 THER/PROPH/DIAG INJ SC/IM: CPT

## 2020-10-14 RX ORDER — INSULIN DEGLUDEC INJECTION 100 U/ML
10 INJECTION, SOLUTION SUBCUTANEOUS DAILY
Qty: 1 ML | Refills: 0 | Status: SHIPPED
Start: 2020-10-14

## 2020-10-14 RX ADMIN — ATORVASTATIN CALCIUM 20 MG: 10 TABLET, FILM COATED ORAL at 09:30

## 2020-10-14 RX ADMIN — METOPROLOL TARTRATE 25 MG: 25 TABLET, FILM COATED ORAL at 09:30

## 2020-10-14 RX ADMIN — PANTOPRAZOLE SODIUM 40 MG: 40 TABLET, DELAYED RELEASE ORAL at 06:31

## 2020-10-14 RX ADMIN — ENOXAPARIN SODIUM 40 MG: 40 INJECTION SUBCUTANEOUS at 09:30

## 2020-10-14 RX ADMIN — ASPIRIN 81 MG: 81 TABLET, CHEWABLE ORAL at 09:30

## 2020-10-14 ASSESSMENT — PAIN SCALES - GENERAL
PAINLEVEL_OUTOF10: 0

## 2020-10-14 NOTE — CARE COORDINATION
Pt a/o, able to ambulate, has PCP and insurance. Pt has not DCP needs at this time. Should needs arise, please contact DCP.

## 2020-10-14 NOTE — PROGRESS NOTES
Pt assessment completed and charted. VSS. Pt is refusing bed alarm. RN explained the risk for falling but the pt continues to refuse. Bed in lowest position and wheels locked. Call light within reach. Bedside table within reach. Non-skid footwear in place. Pt denies any other needs at this time. Pt calls out appropriately. Will continue to monitor.

## 2020-10-14 NOTE — PLAN OF CARE
Problem: Falls - Risk of:  Goal: Absence of physical injury  Description: Absence of physical injury  Outcome: Completed

## 2020-10-14 NOTE — PROCEDURES
Patient: Yelitza Julien    MR Number: [id number]  YOB: 1940  Date of Visit: 10/14/2020    Clinical History:  The patient is a 79y.o. years old male with acute encephalopathy and possible seizure. Method: The EEG was performed utilizing the international 10/20 of electrode placements of both referential and bipolar montages. The patient was awake and drowsy through out the recording. Findings: The background of the EEG showed normal alpha posterior background of 8 HZ and amplitude of 20-40 UV. This background was symmetric, waxing and waning, and reactive with eye opening and closure. As the patient became drowsy, generalized diffuse slowing was seen through recording at 6-7 HZ. This generalized slowing was symmetric, non rhythmical, and continuous. No spike or sharp waves were seen. Impression: This EEG  is within normal limits. There is no evidence of epileptiform discharges, focal, or lateralizing abnormalities.       Hermila Smith MD      Board certified in clinical neurophysiology

## 2020-10-14 NOTE — DISCHARGE SUMMARY
Hospital Medicine Discharge Summary    Patient: Kamran Prescott     Age: 79 y.o. Gender: male  : 1940   MRN: [id number]  Code Status: Full     Admit Date: 10/12/2020   Discharge Date:   10/14/2020  Disposition:  Home     Condition at Discharge: Stable    Primary Care Provider: Errol Melo MD    Admitting Physician: Chasity Cardoso MD  Discharge Physician: Marycarmen Guerrero MD       Discharge Diagnoses: Active Hospital Problems    Diagnosis    ELMER (acute kidney injury) ([facility] [N17.9]    CKD stage 3 due to type 2 diabetes mellitus ([facility] [E11.22, N18.30]    Acute encephalopathy [G93.40]    Cerebrovascular accident (CVA) ([facility] [I63.9]    DM (diabetes mellitus), secondary, uncontrolled, w/neurologic complic ([facility] [V06.98, U46.71]    Hyponatremia [E87.1]    AMS (altered mental status) [R41.82]    History of aortic valve replacement with bioprosthetic valve [Z95.3]    ROOSEVELT (obstructive sleep apnea) [G47.33]    CAD S/P percutaneous coronary angioplasty [I25.10, Z98.61]    S/P left atrial appendage ligation [Z98.890]    H/O partial nephrectomy [Z90.5]    Seizure-like activity ([facility] [R56.9]    Type 2 diabetes mellitus with hypoglycemia without coma, with long-term current use of insulin ([facility] [E32.814, Z79.4]    HTN (hypertension), benign [I10]       Hospital Course:     Kamran Prescott is a 79 y.o. male. He was accepted in transfer from [facility] ER. He presented from home via ambulance. EMS was activated by his spouse. History provided by the patient's spouse, Rocio Guerra, who related patient had on three separate occasions in the past week begun to suddenly flail and shout. The first episode occurred 6 days ago, the second 5 days ago, and the third this morning. One spell occurred in bed and patient wound up between his bed and a nearby wall. His spouse had great difficulty getting him out (she is 80years old). All spells have occurred in the morning.     Spells are more specifically described as involuntary appearing \"flailing\" motions involving patient entire body, loud shouting, and all have been followed by disorientation and complaints of headdache. His spouse related \"he looked at me like he didn't know me. \"  He has clenched his head in his hands and reported a headache after each episode. He does have some degree of chronic memory loss at baseline. At baseline he has difficulty reporting the current date and sometimes his current location. Patient is able to confirm the above history. He does not seem to be completely amnestic regarding these events. He is unable to relate any other detail of value though. Spouse relates she activated EMS today because of today's spell and \"Things can't go on like this. \"  When medics arrived patient was not \"flailing\" or demonstrating any other abnormal movement. They did check his FSBS twice though and he was hypoglycemic the second time. He was treated with IV D50 and thus far his hypoglycemia has not recurred. Assessment/Plan:    Seizure-Like Activity  -  Description of events as episodic involving widespread involuntary movement and loud guttural vocalization followed by headache and disorientation is suggestive of a seizure and post-ictal period.    - Brain MRI negative. - EEG negative  - Per Neurology \"Follow-up with me outpatient with family in 2 to 3 weeks to get more history regarding his recent events. Would not start AED at this point giving high risk of side effect and more cognitive decline\"    DM 2 w/ hypoglycemia  -  Euglycemic currently. Patient is prescribed high dose detemir insulin at home (70 U qam and 50 U qhs) but his compliance is uncertain. Amaryl also listed on medrec but not certain if taking. Given his CKD and hypoglycemic spells, it would be strongly recommended to avoid Sulfonylurea's. - His BS remains stable but certainly not significantly elevated here.    - Recommend resuming home Insulin regimen at 10 units daily, then titrating up as needed. - Discontinue Amaryl if he was taking. ELMER/CKD Stage 3: Unclear what recent baseline is but does have evidence of CKD stage 3 in remote labs, most likely 2/2 DM2. No culprit meds. No evidence of dehydration. Avoid NSAIDs. Outpatient monitoring. CAD s/p PCI, s/p bioAVR, elevated troponin  -  Continue ASA, statin, metoprolol.  -  Troponin is down-trending c/w type 2 MI from supply-demand mismatch in setting of ELMER/CKD. ROOSEVELT, RLS  -  Noncompliant with CPAP. -  Continue home ropinirole. Exam:   /74   Pulse 57   Temp 97.9 °F (36.6 °C) (Oral)   Resp 16   Ht 5' 10.5\" (1.791 m)   Wt 225 lb 11.2 oz (102.4 kg)   SpO2 99%   BMI 31.93 kg/m²   General appearance: No apparent distress, appears stated age and cooperative. HEENT: Pupils equal, round, and reactive to light. Conjunctivae/corneas clear. Neck: Supple, no jugular venous distention. Trachea midline with full range of motion. Respiratory:  Normal respiratory effort. Clear to auscultation, bilaterally without Rales/Wheezes/Rhonchi. Cardiovascular: Regular rate and rhythm with normal S1/S2 without murmurs, rubs or gallops. Abdomen: Soft, non-tender, non-distended with normal bowel sounds. Musculoskelatal: No clubbing, cyanosis or edema bilaterally. Full range of motion without deformity. Neurologic:  Neurovascularly intact without any focal sensory/motor deficits. Cranial nerves: II-XII intact, grossly non-focal.  Psychiatric: Alert and oriented, limited insight  Skin: Skin color, texture, turgor normal.  No rashes or lesions. Capillary Refill: Brisk,< 3 seconds   Peripheral Pulses: +2 palpable, equal bilaterally     Patient Discharge Instructions: Follow-up with Neurology in 2-3 weeks  Follow-up with PCP in 1-2 weeks.      Discharge Medications:   Current Discharge Medication List        Current Discharge Medication List      CONTINUE these medications which have CHANGED    Details Insulin Degludec (TRESIBA) 100 UNIT/ML SOLN Inject 10 Units into the skin daily  Qty: 1 mL, Refills: 0           Current Discharge Medication List      CONTINUE these medications which have NOT CHANGED    Details   simvastatin (ZOCOR) 40 MG tablet Take 1 tablet by mouth nightly HOLD MEDICATION UNTIL AFTER YOUR FOLLOW UP WITH THE GI SPECIALIST  Qty: 30 tablet, Refills: 3      rOPINIRole (REQUIP) 0.5 MG tablet Take 1 tablet by mouth nightly 1-2 hours before bedtime  Qty: 30 tablet, Refills: 6      omeprazole (PRILOSEC) 20 MG capsule Take 40 mg by mouth nightly       metoprolol (LOPRESSOR) 25 MG tablet Take by mouth nightly Indications: Take DOS       aspirin 81 MG chewable tablet Take 81 mg by mouth daily. Indications: stopped 01-           Current Discharge Medication List      STOP taking these medications       glimepiride (AMARYL) 1 MG tablet Comments:   Reason for Stopping:         insulin detemir (LEVEMIR) 100 UNIT/ML injection vial Comments:   Reason for Stopping:         glipiZIDE (GLUCOTROL) 10 MG tablet Comments:   Reason for Stopping:                 Significant Test Results    Xr Chest (2 Vw)    Result Date: 10/12/2020  EXAMINATION: TWO XRAY VIEWS OF THE CHEST 10/12/2020 1:34 pm COMPARISON: Prior study(s) most recent 09/03/2015. HISTORY: ORDERING SYSTEM PROVIDED HISTORY: AMS TECHNOLOGIST PROVIDED HISTORY: Reason for exam:->AMS Reason for Exam: ams Acuity: Unknown Type of Exam: Unknown FINDINGS: The heart is borderline to mildly enlarged but unchanged. No acute airspace disease. Prior sternotomy, valve replacement and atrial clip procedure evident. No pneumothorax or pleural effusion. No change from prior study. No acute cardiac or pulmonary disease.      Ct Head Wo Contrast    Result Date: 10/12/2020  EXAMINATION: CT OF THE HEAD WITHOUT CONTRAST  10/12/2020 1:27 pm TECHNIQUE: CT of the head was performed without the administration of intravenous contrast. Dose modulation, iterative reconstruction, and/or weight based adjustment of the mA/kV was utilized to reduce the radiation dose to as low as reasonably achievable. COMPARISON: None. HISTORY: ORDERING SYSTEM PROVIDED HISTORY: AMS TECHNOLOGIST PROVIDED HISTORY: If patient is on cardiac monitor and/or pulse ox, they may be taken off cardiac monitor and pulse ox, left on O2 if currently on. All monitors reattached when patient returns to room. Has a \"code stroke\" or \"stroke alert\" been called? ->No Reason for exam:->AMS Reason for Exam: AMS Acuity: Unknown Type of Exam: Unknown FINDINGS: BRAIN/VENTRICLES: There is no acute intracerebral hemorrhage or extra-axial fluid collection. There is moderate cerebral atrophy with mild and subcortical periventricular white matter small vessel ischemic disease. There is an age-indeterminate lacunar infarct involving the left basal ganglia, likely old. ORBITS: Status post bilateral cataract removal. SINUSES: The visualized paranasal sinuses and mastoid air cells are clear. SOFT TISSUES/SKULL:  The calvarium is intact. 1. No acute intracranial abnormality. Mri Brain Wo Contrast    Result Date: 10/13/2020  EXAMINATION: MRI OF THE BRAIN WITHOUT CONTRAST  10/13/2020 4:06 pm TECHNIQUE: Multiplanar multisequence MRI of the brain was performed without the administration of intravenous contrast. COMPARISON: CT brain 10/12/2020 HISTORY: ORDERING SYSTEM PROVIDED HISTORY: Episodic generalized involuntary movements followed by disorientation and headache. Clinical suspicion for new seizure disorder. Study to evaluate for underlying structural lesion. TECHNOLOGIST PROVIDED HISTORY: Reason for exam:->Episodic generalized involuntary movements followed by disorientation and headache. Clinical suspicion for new seizure disorder. Study to evaluate for underlying structural lesion. Reason for Exam: Episodic generalized involuntary movements followed by disorientation and headache.   Clinical suspicion for new seizure disorder. Study to evaluate for underlying structural lesion. Acuity: Acute Type of Exam: Ongoing FINDINGS: INTRACRANIAL STRUCTURES/VENTRICLES: There are no areas of restricted diffusion identified to suggest an acute infarct. There is no acute intracranial hemorrhage. No mass effect or midline shift is present. There is mild diffuse cerebral volume loss. There are multiple foci of abnormal increased T2/FLAIR signal intensity within the periventricular, subcortical and deep white matter of both hemispheres. The brain is otherwise of normal signal intensities. There is no sellar or suprasellar mass present. There is no ventriculomegaly or abnormal extra-axial fluid collection present. The proximal portions of the [place] demonstrate normal flow voids. ORBITS: Limited evaluation of the orbits is unremarkable. SINUSES: The paranasal sinuses and mastoid air cells are clear. BONES/SOFT TISSUES: Bone marrow signal intensity is normal.     1. No acute intracranial process identified. 2. Mild diffuse cerebral volume loss and mild chronic small vessel ischemic changes. Consults:     IP CONSULT TO NEUROLOGY    Labs:  For convenience and continuity at follow-up the following most recent labs are provided:    Lab Results   Component Value Date    WBC 8.0 10/14/2020    HGB 15.1 10/14/2020    HCT 44.5 10/14/2020    MCV 92.4 10/14/2020     10/14/2020     10/14/2020    K 5.1 10/14/2020    K 4.8 10/12/2020     10/14/2020    CO2 27 10/14/2020    BUN 25 10/14/2020    CREATININE 1.7 10/14/2020    CALCIUM 9.0 10/14/2020    TROPONINI 0.11 10/12/2020    ALKPHOS 111 10/12/2020    ALT 16 10/12/2020    AST 27 10/12/2020    BILITOT 0.5 10/12/2020    BILIDIR 5.4 07/17/2019    LABALBU 3.9 10/12/2020    LABA1C 7.1 10/13/2020     Lab Results   Component Value Date    INR 1.18 (H) 07/22/2019    INR 1.27 (H) 07/17/2019         The patient was seen and examined on day of discharge and this discharge summary is in conjunction with any daily progress note from day of discharge. Time spent on discharge is more than 30 minutes in the examination, evaluation, counseling and review of medications and discharge plan. Signed:    Diamond Ruiz MD   10/14/2020    Thank you Malissa Sanchez MD for the opportunity to be involved in this patient's care. If you have any questions or concerns please feel free to contact my office [phone].

## 2020-10-14 NOTE — PROGRESS NOTES
Gage Woodward  Neurology Follow-up  [facility]    Date of Service: 10/14/2020    Subjective:   CC: Follow up today regarding: Acute encephalopathy and possible seizure. Events noted. Chart and lab reviewed. The patient denies any new symptoms today. He feels back to his baseline. He is not a good historian. Denies any headache, dysphagia or dysarthria. MRI of the brain showed no acute lesion. No acute stroke. Other review of system was unremarkable. ROS : A 10-12 system review obtained and updated today and is unremarkable except as mentioned  in my interval history. family history includes Diabetes in his mother; Heart Disease in his father and mother.     Past Medical History:   Diagnosis Date    Achilles tendon tear     Acid reflux     Anesthesia     woke up during surery and was combative x1, hsd hsf surgery since and done OK    CAD S/P percutaneous coronary angioplasty 10/12/2020    Calculus of gallbladder with acute on chronic cholecystitis without obstruction 7/18/2019    Cerebrovascular accident (CVA) ([address]     Diabetes mellitus ([address]     Heart murmur H/O    Hypercholesteremia     Hypertension     Right renal mass 5/16/2013    solid renal mass needing further work up     Current Facility-Administered Medications   Medication Dose Route Frequency Provider Last Rate Last Dose    sodium chloride flush 0.9 % injection 10 mL  10 mL Intravenous PRN oRxi Donahue MD   10 mL at 10/13/20 1958    potassium chloride (KLOR-CON M) extended release tablet 40 mEq  40 mEq Oral PRN Roxi Donahue MD        Or    potassium bicarb-citric acid (EFFER-K) effervescent tablet 40 mEq  40 mEq Oral PRN Roxi Donahue MD        Or    potassium chloride 10 mEq/100 mL IVPB (Peripheral Line)  10 mEq Intravenous PRN Roxi Donahue MD        magnesium sulfate 1 g in dextrose 5% 100 mL IVPB  1 g Intravenous PRN Roxi Donahue MD        acetaminophen (TYLENOL) tablet 650 mg 650 mg Oral Q6H PRN Kerwin Alex MD        Or    acetaminophen (TYLENOL) suppository 650 mg  650 mg Rectal Q6H PRN Kerwin Alex MD        promethazine (PHENERGAN) tablet 12.5 mg  12.5 mg Oral Q6H PRN Kerwin Alex MD        Or    ondansetron [facility]) injection 4 mg  4 mg Intravenous Q6H PRN Kerwin Alex MD        enoxaparin (LOVENOX) injection 40 mg  40 mg Subcutaneous Daily Kerwin Alex MD   40 mg at 10/14/20 0930    melatonin ER tablet 2 mg  2 mg Oral Nightly PRN Kerwin Alex MD        aspirin chewable tablet 81 mg  81 mg Oral Daily Kerwin Alex MD   81 mg at 10/14/20 0930    metoprolol tartrate (LOPRESSOR) tablet 25 mg  25 mg Oral BID Kerwin Alex MD   25 mg at 10/14/20 0930    pantoprazole (PROTONIX) tablet 40 mg  40 mg Oral QAM AC Kerwin Alex MD   40 mg at 10/14/20 0631    rOPINIRole (REQUIP) tablet 0.5 mg  0.5 mg Oral Nightly Kerwin Alex MD   0.5 mg at 10/13/20 1958    atorvastatin (LIPITOR) tablet 20 mg  20 mg Oral Daily Kerwin Alex MD   20 mg at 10/14/20 0930    LORazepam (ATIVAN) injection 4 mg  4 mg Intravenous PRN Kerwin Alex MD        glucose (GLUTOSE) 40 % oral gel 15 g  15 g Oral PRN Kerwin Alex MD        dextrose 50 % IV solution  12.5 g Intravenous PRN Kerwin Alex MD        glucagon (rDNA) injection 1 mg  1 mg Intramuscular PRN Kerwin Alex MD        dextrose 5 % solution  100 mL/hr Intravenous PRN Kerwin Alex MD        insulin lispro (HUMALOG) injection vial 0-6 Units  0-6 Units Subcutaneous TID WC Kerwin Alex MD        insulin lispro (HUMALOG) injection vial 0-3 Units  0-3 Units Subcutaneous Nightly Kerwin Alex MD         No Known Allergies   reports that he quit smoking about 22 years ago. He has a 30.00 pack-year smoking history. He quit smokeless tobacco use about 36 years ago. He reports that he does not drink alcohol or use drugs.        Objective:  Exam:   Constitutional: Vitals:    10/14/20 0403 10/14/20 0628 10/14/20 0929 10/14/20 1156   BP: 122/76  114/68 123/82   Pulse: 63  59 60   Resp: 16  15 14   Temp: 98.2 °F (36.8 °C)  98.1 °F (36.7 °C) 97.9 °F (36.6 °C)   TempSrc: Oral  Oral Oral   SpO2: 96%  98% 97%   Weight:  225 lb 11.2 oz (102.4 kg)     Height:         General appearance:  Normal development and appear in no acute distress. Eye: No icterus. Neck: supple  Cardiovascular:  No lower leg edema with good pulsation. Mental Status:   AAO x3 today  Poor immediate recall  Intact remote memory  Fluent language  Good attention today  Poor fund of knowledge  Cranial Nerves:   II: Visual fields: Full. Pupils: equal, round, reactive to light  III,IV,VI: Extra Ocular Movements are intact. No nystagmus  V: Facial sensation is intact  VII: Facial strength and movements: intact and symmetric  IX: Palate elevation is symmetric  XI: Shoulder shrug is intact  XII: Tongue movements are normal  Musculoskeletal: 5/5 in all 4 extremities. Tone: Normal tone. Reflexes: Symmetric 2+ in both arms and legs. Coordination: no pronator drift, no dysmetria with FNF. Normal REM. Sensation: normal to all modalities in both arms and legs.   Gait/Posture: steady gait        Data:  LABS:   Lab Results   Component Value Date     10/14/2020    K 5.1 10/14/2020    K 4.8 10/12/2020     10/14/2020    CO2 27 10/14/2020    BUN 25 10/14/2020    CREATININE 1.7 10/14/2020    GFRAA 47 10/14/2020    GFRAA >60 05/17/2013    LABGLOM 39 10/14/2020    GLUCOSE 151 10/14/2020    MG 1.80 10/14/2020    CALCIUM 9.0 10/14/2020     Lab Results   Component Value Date    WBC 8.0 10/14/2020    RBC 4.81 10/14/2020    HGB 15.1 10/14/2020    HCT 44.5 10/14/2020    MCV 92.4 10/14/2020    RDW 15.6 10/14/2020     10/14/2020     Lab Results   Component Value Date    INR 1.18 (H) 07/22/2019    PROTIME 13.4 (H) 07/22/2019       Neuroimaging was independently reviewed by me and discussed results with the patient  I reviewed blood testing and other test results and discussed results with the patient      Impression:  Acute encephalopathy, no specific etiology. Recent MRI showed no acute stroke or abnormal lesion. 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 Chronic cognitive impairment and underlying dementia      Recommendation  EEG showed no epileptiform discharges. Continue current supportive care  Aspirin  Statin  Insulin sliding scale  DVT and GI prophylaxis  Speech and swallow evaluation  Continue current blood pressure medications  Seizure precautions  Can be discharged from neurology when medically stable  Follow-up with me outpatient with family in 2 to 3 weeks to get more history regarding his recent events. Would not start AED at this point giving high risk of side effect and more cognitive decline. Tina Soto MD   [phone]      This dictation was generated by voice recognition computer software. Although all attempts are made to edit the dictation for accuracy, there may be errors in the transcription that are not intended.

## 2021-04-05 ENCOUNTER — HOSPITAL ENCOUNTER (OUTPATIENT)
Dept: GENERAL RADIOLOGY | Age: 81
Discharge: HOME | End: 2021-04-05
Payer: MEDICARE

## 2021-04-05 ENCOUNTER — HOSPITAL ENCOUNTER (OUTPATIENT)
Age: 81
Discharge: HOME | End: 2021-04-05
Payer: MEDICARE

## 2021-04-05 DIAGNOSIS — R06.02 SOB (SHORTNESS OF BREATH): ICD-10-CM

## 2021-04-05 PROCEDURE — 71046 X-RAY EXAM CHEST 2 VIEWS: CPT

## 2021-06-30 ENCOUNTER — HOSPITAL ENCOUNTER (OUTPATIENT)
Age: 81
Discharge: HOME | End: 2021-06-30
Payer: MEDICARE

## 2021-06-30 ENCOUNTER — HOSPITAL ENCOUNTER (OUTPATIENT)
Dept: GENERAL RADIOLOGY | Age: 81
Discharge: HOME | End: 2021-06-30
Payer: MEDICARE

## 2021-06-30 DIAGNOSIS — R06.02 SHORTNESS OF BREATH: ICD-10-CM

## 2021-06-30 PROCEDURE — 71046 X-RAY EXAM CHEST 2 VIEWS: CPT

## 2021-11-15 ENCOUNTER — HOSPITAL ENCOUNTER (OUTPATIENT)
Dept: MRI IMAGING | Age: 81
Discharge: HOME | End: 2021-11-15
Payer: MEDICARE

## 2021-11-15 DIAGNOSIS — M51.36 DEGENERATION OF LUMBAR INTERVERTEBRAL DISC: ICD-10-CM

## 2021-11-15 DIAGNOSIS — M54.50 LOW BACK PAIN, UNSPECIFIED BACK PAIN LATERALITY, UNSPECIFIED CHRONICITY, UNSPECIFIED WHETHER SCIATICA PRESENT: ICD-10-CM

## 2021-11-15 PROCEDURE — 72148 MRI LUMBAR SPINE W/O DYE: CPT

## 2021-12-22 ENCOUNTER — APPOINTMENT (OUTPATIENT)
Dept: CT IMAGING | Age: 81
End: 2021-12-22
Payer: MEDICARE

## 2021-12-22 ENCOUNTER — APPOINTMENT (OUTPATIENT)
Dept: GENERAL RADIOLOGY | Age: 81
End: 2021-12-22
Payer: MEDICARE

## 2021-12-22 ENCOUNTER — HOSPITAL ENCOUNTER (EMERGENCY)
Age: 81
Discharge: LEFT BEFORE DISCHARGE | End: 2021-12-23
Attending: EMERGENCY MEDICINE
Payer: MEDICARE

## 2021-12-22 DIAGNOSIS — R42 DIZZINESS: Primary | ICD-10-CM

## 2021-12-22 LAB
A/G RATIO: 1 (ref 1.1–2.2)
ALBUMIN SERPL-MCNC: 3.7 G/DL (ref 3.4–5)
ALP BLD-CCNC: 113 U/L (ref 40–129)
ALT SERPL-CCNC: 9 U/L (ref 10–40)
ANION GAP SERPL CALCULATED.3IONS-SCNC: 14 MMOL/L (ref 3–16)
AST SERPL-CCNC: 12 U/L (ref 15–37)
BASOPHILS ABSOLUTE: 0.1 K/UL (ref 0–0.2)
BASOPHILS RELATIVE PERCENT: 1 %
BILIRUB SERPL-MCNC: 0.5 MG/DL (ref 0–1)
BUN BLDV-MCNC: 54 MG/DL (ref 7–20)
CALCIUM SERPL-MCNC: 8.5 MG/DL (ref 8.3–10.6)
CHLORIDE BLD-SCNC: 100 MMOL/L (ref 99–110)
CO2: 22 MMOL/L (ref 21–32)
CREAT SERPL-MCNC: 1.9 MG/DL (ref 0.8–1.3)
EOSINOPHILS ABSOLUTE: 0.2 K/UL (ref 0–0.6)
EOSINOPHILS RELATIVE PERCENT: 2.2 %
GFR AFRICAN AMERICAN: 41
GFR NON-AFRICAN AMERICAN: 34
GLUCOSE BLD-MCNC: 242 MG/DL (ref 70–99)
GLUCOSE BLD-MCNC: 279 MG/DL
GLUCOSE BLD-MCNC: 279 MG/DL (ref 70–99)
HCT VFR BLD CALC: 43.3 % (ref 40.5–52.5)
HEMOGLOBIN: 14.8 G/DL (ref 13.5–17.5)
INFLUENZA A: NOT DETECTED
INFLUENZA B: NOT DETECTED
LYMPHOCYTES ABSOLUTE: 2 K/UL (ref 1–5.1)
LYMPHOCYTES RELATIVE PERCENT: 21.4 %
MAGNESIUM: 1.3 MG/DL (ref 1.8–2.4)
MCH RBC QN AUTO: 31.8 PG (ref 26–34)
MCHC RBC AUTO-ENTMCNC: 34.1 G/DL (ref 31–36)
MCV RBC AUTO: 93.1 FL (ref 80–100)
MONOCYTES ABSOLUTE: 0.9 K/UL (ref 0–1.3)
MONOCYTES RELATIVE PERCENT: 9 %
NEUTROPHILS ABSOLUTE: 6.3 K/UL (ref 1.7–7.7)
NEUTROPHILS RELATIVE PERCENT: 66.4 %
PDW BLD-RTO: 15.3 % (ref 12.4–15.4)
PERFORMED ON: ABNORMAL
PLATELET # BLD: 253 K/UL (ref 135–450)
PMV BLD AUTO: 7.6 FL (ref 5–10.5)
POTASSIUM SERPL-SCNC: 4.2 MMOL/L (ref 3.5–5.1)
PRO-BNP: 757 PG/ML (ref 0–449)
RBC # BLD: 4.65 M/UL (ref 4.2–5.9)
SARS-COV-2 RNA, RT PCR: NOT DETECTED
SODIUM BLD-SCNC: 136 MMOL/L (ref 136–145)
TOTAL PROTEIN: 7.4 G/DL (ref 6.4–8.2)
TROPONIN: 0.03 NG/ML
WBC # BLD: 9.5 K/UL (ref 4–11)

## 2021-12-22 PROCEDURE — 71045 X-RAY EXAM CHEST 1 VIEW: CPT

## 2021-12-22 PROCEDURE — 2580000003 HC RX 258: Performed by: EMERGENCY MEDICINE

## 2021-12-22 PROCEDURE — 85025 COMPLETE CBC W/AUTO DIFF WBC: CPT

## 2021-12-22 PROCEDURE — 83880 ASSAY OF NATRIURETIC PEPTIDE: CPT

## 2021-12-22 PROCEDURE — 70450 CT HEAD/BRAIN W/O DYE: CPT

## 2021-12-22 PROCEDURE — 84484 ASSAY OF TROPONIN QUANT: CPT

## 2021-12-22 PROCEDURE — 93005 ELECTROCARDIOGRAM TRACING: CPT | Performed by: EMERGENCY MEDICINE

## 2021-12-22 PROCEDURE — 83735 ASSAY OF MAGNESIUM: CPT

## 2021-12-22 PROCEDURE — 80053 COMPREHEN METABOLIC PANEL: CPT

## 2021-12-22 PROCEDURE — 87636 SARSCOV2 & INF A&B AMP PRB: CPT

## 2021-12-22 PROCEDURE — 99284 EMERGENCY DEPT VISIT MOD MDM: CPT

## 2021-12-22 RX ORDER — 0.9 % SODIUM CHLORIDE 0.9 %
500 INTRAVENOUS SOLUTION INTRAVENOUS ONCE
Status: COMPLETED | OUTPATIENT
Start: 2021-12-22 | End: 2021-12-22

## 2021-12-22 RX ADMIN — SODIUM CHLORIDE 500 ML: 9 INJECTION, SOLUTION INTRAVENOUS at 21:50

## 2021-12-22 ASSESSMENT — PAIN DESCRIPTION - LOCATION: LOCATION: ABDOMEN

## 2021-12-22 ASSESSMENT — PAIN SCALES - GENERAL: PAINLEVEL_OUTOF10: 5

## 2021-12-23 VITALS
WEIGHT: 230 LBS | DIASTOLIC BLOOD PRESSURE: 64 MMHG | TEMPERATURE: 97.6 F | RESPIRATION RATE: 18 BRPM | SYSTOLIC BLOOD PRESSURE: 125 MMHG | HEIGHT: 70 IN | HEART RATE: 72 BPM | BODY MASS INDEX: 32.93 KG/M2 | OXYGEN SATURATION: 99 %

## 2021-12-23 LAB
EKG ATRIAL RATE: 68 BPM
EKG DIAGNOSIS: NORMAL
EKG P AXIS: 50 DEGREES
EKG P-R INTERVAL: 184 MS
EKG Q-T INTERVAL: 440 MS
EKG QRS DURATION: 146 MS
EKG QTC CALCULATION (BAZETT): 467 MS
EKG R AXIS: -38 DEGREES
EKG T AXIS: 70 DEGREES
EKG VENTRICULAR RATE: 68 BPM

## 2021-12-23 PROCEDURE — 93010 ELECTROCARDIOGRAM REPORT: CPT | Performed by: INTERNAL MEDICINE

## 2021-12-23 NOTE — ED NOTES
Pt provided paperwork, educated to follow up with PCP. Ambulates out with steady gait accompanied by spouse.      Juanjo Lomeli RN  12/23/21 2166

## 2021-12-23 NOTE — ED NOTES
Dr. Vince Humphrey at bedside, explained benefits of CTA as well as kidney function. Pt adamant that he does not want any further damage to kidneys and is refusing CTA. Pt states to this RN \"I am not staying the night\".      Andrei Noe RN  12/22/21 0730

## 2021-12-23 NOTE — ED PROVIDER NOTES
[address] ED      CHIEF COMPLAINT  Hypotension (Pt states that he has had low BP for about three days. Pt states that he has had dizziness. Pt states that he hasn't been able to test his BG.)       HISTORY OF PRESENT ILLNESS  Ehsan Carney is a [de-identified] y.o. male with history of diabetes, hypertension, CHF, history of aortic valve replacement who presents to the emergency department for evaluation after he was having difficulty getting his blood pressure readings at home. Patient reports he checks his blood pressure every day. He tried to check his blood pressure at home and kept reading error. Wife says that she was able to get a reading so they brought him in for evaluation. Patient reports he has no symptoms. However, per the wife at bedside, over the past 6 months, he has been intermittently having dizziness, falls. Reports over the past 3 days, patient has been having diarrhea. Denies having chest pain, shortness of breath, lightheadedness, abdominal pain. Reports the last fall was approximately 1 week ago. Patient reports he has difficulty with balance or sometimes room spinning sensation and perhaps that is why he falls. Says he felt fine afterwards and did not come to the emergency department for evaluation. Reports he recently had an MRI of his head. Has been talking to his PCP about these problems. Denies weight gain, leg swellings, calf tenderness. No history of blood clots. No other complaints, modifying factors or associated symptoms. I have reviewed the following from the nursing documentation.     Past Medical History:   Diagnosis Date    Achilles tendon tear     Acid reflux     Anesthesia     woke up during surery and was combative x1, hsd hsf surgery since and done OK    CAD S/P percutaneous coronary angioplasty 10/12/2020    Calculus of gallbladder with acute on chronic cholecystitis without obstruction 7/18/2019    C[facility]     Diabetes mellitus [facility]     Heart murmur H/O    Hypercholesteremia     Hypertension     Right renal mass 2013    solid renal mass needing further work up     Past Surgical History:   Procedure Laterality Date    AORTIC VALVE REPLACEMENT      CARDIAC SURGERY      cardiac stents x2   Angel Chen CARDIAC SURGERY      [facility] [address] [place] [address] VALVE SURGERY  2013    Bovine heart valve    CHOLECYSTECTOMY, LAPAROSCOPIC N/A 2019    LAPAROSCOPIC CHOLECYSTECTOMY WITH CHOLANGIOGRAMS performed by Vanda Davis MD at [place] [address] ERCP N/A 2019    ERCP SPHINCTER/PAPILLOTOMY performed by Lisa Haines DO at [facility] ERCP  2019    ERCP STONE REMOVAL performed by Lisa Haines DO at [address] [facility] Left     EYE SURGERY Right 13    cataract with lens implant    FOOT SURGERY      wound from piece of glass    KIDNEY BIOPSY      OTHER SURGICAL HISTORY  1/30/15    excision of (2) cysts on the back and (1) on neck    OTHER SURGICAL HISTORY Right 2017    REPAIR ACHILLES TEAR PRIMARY RIGHT FOOT, REPAIR PERONEAL    OTHER SURGICAL HISTORY  2019    laparoscopic cholecystectomy with cholangiograms     Family History   Problem Relation Age of Onset    Diabetes Mother     Heart Disease Mother     Heart Disease Father      Social History     Socioeconomic History    Marital status:      Spouse name: Not on file    Number of children: Not on file    Years of education: Not on file    Highest education level: Not on file   Occupational History    Occupation:     Tobacco Use    Smoking status: Former Smoker     Packs/day: 1.00     Years: 30.00     Pack years: 30.00     Quit date: 5/15/1998     Years since quittin.6    Smokeless tobacco: Former User     Quit date: 8/15/1984   Vaping Use    Vaping Use: Never used   Substance and Sexual Activity    Alcohol use: No    Drug use: No    Sexual activity: Not on file   Other Topics Concern    Not on file   Social History Narrative    Not on file     Social Determinants of Health     Financial Resource Strain:     Difficulty of Paying Living Expenses: Not on file   Food Insecurity:     Worried About Running Out of Food in the Last Year: Not on file    Jay of Food in the Last Year: Not on file   Transportation Needs:     Lack of Transportation (Medical): Not on file    Lack of Transportation (Non-Medical): Not on file   Physical Activity:     Days of Exercise per Week: Not on file    Minutes of Exercise per Session: Not on file   Stress:     Feeling of Stress : Not on file   Social Connections:     Frequency of Communication with Friends and Family: Not on file    Frequency of Social Gatherings with Friends and Family: Not on file    Attends Gnosticism Services: Not on file    Active Member of [address] [place] [address] [employer] or Organizations: Not on file    Attends Club or Organization Meetings: Not on file    Marital Status: Not on file   Intimate Partner Violence:     Fear of Current or Ex-Partner: Not on file    Emotionally Abused: Not on file    Physically Abused: Not on file    Sexually Abused: Not on file   Housing Stability:     Unable to Pay for Housing in the Last Year: Not on file    Number of [place] in the Last Year: Not on file    Unstable Housing in the Last Year: Not on file     No current facility-administered medications for this encounter.      Current Outpatient Medications   Medication Sig Dispense Refill    Insulin Degludec (TRESIBA) 100 UNIT/ML SOLN Inject 10 Units into the skin daily 1 mL 0    simvastatin (ZOCOR) 40 MG tablet Take 1 tablet by mouth nightly HOLD MEDICATION UNTIL AFTER YOUR FOLLOW UP WITH THE GI SPECIALIST 30 tablet 3    rOPINIRole (REQUIP) 0.5 MG tablet Take 1 tablet by mouth nightly 1-2 hours before bedtime 30 tablet 6    omeprazole (PRILOSEC) 20 MG capsule Take 40 mg by mouth nightly       metoprolol (LOPRESSOR) 25 MG tablet Take by mouth nightly Indications: Take DOS       aspirin 81 MG chewable tablet Take 81 mg by mouth daily. Indications: stopped 01-       No Known Allergies    REVIEW OF SYSTEMS  10 systems reviewed, pertinent positives per HPI otherwise noted to be negative. PHYSICAL EXAM  /69   Pulse 75   Temp 97.6 °F (36.4 °C) (Temporal)   Resp 14   Ht 5' 10\" (1.778 m)   Wt 230 lb (104.3 kg)   SpO2 99%   BMI 33.00 kg/m²    GENERAL APPEARANCE: Awake and alert. Clinically nontoxic appearing  HENT: Normocephalic. Atraumatic. PERRL. EOMI. No facial droop. HEART/CHEST: RRR. LUNGS: Respirations unlabored. Speaking comfortably in full sentences. Clear to auscultation bilaterally. ABDOMEN: Soft, non-distended abdomen. Non tender to palpation. No guarding. No rebound. EXTREMITIES: No gross deformities. Moving all extremities. No significant lower extremity edema present. Palpable pulses to all extremities. Negative calf tenderness bilaterally. SKIN: Warm and dry. No acute rashes. NEUROLOGICAL: Alert and oriented. No gross facial drooping. Answering questions appropriately. Moving all extremities. PSYCHIATRIC: Pleasant. Normal mood and affect.     LABS  Results for orders placed or performed during the hospital encounter of 12/22/21   COVID-19 & Influenza Combo    Specimen: Nasopharyngeal Swab   Result Value Ref Range    SARS-CoV-2 RNA, RT PCR NOT DETECTED NOT DETECTED    INFLUENZA A NOT DETECTED NOT DETECTED    INFLUENZA B NOT DETECTED NOT DETECTED   Comprehensive Metabolic Panel   Result Value Ref Range    Sodium 136 136 - 145 mmol/L    Potassium 4.2 3.5 - 5.1 mmol/L    Chloride 100 99 - 110 mmol/L    CO2 22 21 - 32 mmol/L    Anion Gap 14 3 - 16    Glucose 242 (H) 70 - 99 mg/dL    BUN 54 (H) 7 - 20 mg/dL    CREATININE 1.9 (H) 0.8 - 1.3 mg/dL    GFR Non- 34 (A) >60    GFR  41 (A) >60    Calcium 8.5 8.3 - 10.6 mg/dL Total Protein 7.4 6.4 - 8.2 g/dL    Albumin 3.7 3.4 - 5.0 g/dL    Albumin/Globulin Ratio 1.0 (L) 1.1 - 2.2    Total Bilirubin 0.5 0.0 - 1.0 mg/dL    Alkaline Phosphatase 113 40 - 129 U/L    ALT 9 (L) 10 - 40 U/L    AST 12 (L) 15 - 37 U/L   CBC Auto Differential   Result Value Ref Range    WBC 9.5 4.0 - 11.0 K/uL    RBC 4.65 4.20 - 5.90 M/uL    Hemoglobin 14.8 13.5 - 17.5 g/dL    Hematocrit 43.3 40.5 - 52.5 %    MCV 93.1 80.0 - 100.0 fL    MCH 31.8 26.0 - 34.0 pg    MCHC 34.1 31.0 - 36.0 g/dL    RDW 15.3 12.4 - 15.4 %    Platelets 575 513 - 880 K/uL    MPV 7.6 5.0 - 10.5 fL    Neutrophils % 66.4 %    Lymphocytes % 21.4 %    Monocytes % 9.0 %    Eosinophils % 2.2 %    Basophils % 1.0 %    Neutrophils Absolute 6.3 1.7 - 7.7 K/uL    Lymphocytes Absolute 2.0 1.0 - 5.1 K/uL    Monocytes Absolute 0.9 0.0 - 1.3 K/uL    Eosinophils Absolute 0.2 0.0 - 0.6 K/uL    Basophils Absolute 0.1 0.0 - 0.2 K/uL   Troponin   Result Value Ref Range    Troponin 0.03 (H) <0.01 ng/mL   Brain Natriuretic Peptide   Result Value Ref Range    Pro- (H) 0 - 449 pg/mL   Magnesium   Result Value Ref Range    Magnesium 1.30 (L) 1.80 - 2.40 mg/dL   POCT glucose   Result Value Ref Range    Glucose 279 mg/dL   POCT Glucose   Result Value Ref Range    POC Glucose 279 (H) 70 - 99 mg/dl    Performed on ACCU-Digital H2OK    EKG 12 Lead   Result Value Ref Range    Ventricular Rate 68 BPM    Atrial Rate 68 BPM    P-R Interval 184 ms    QRS Duration 146 ms    Q-T Interval 440 ms    QTc Calculation (Bazett) 467 ms    P Axis 50 degrees    R Axis -38 degrees    T Axis 70 degrees    Diagnosis       Normal sinus rhythmLeft axis deviationRight bundle branch blockInferior infarct (cited on or before 15-MAY-2013)Anterolateral infarct (cited on or before 12-OCT-2020)Abnormal ECGWhen compared with ECG of 12-OCT-2020 12:51,Right bundle branch block has replaced Non-specific intra-ventricular conduction blockQuestionable change in initial forces of Lateral leads       I have reviewed all labs for this visit. ECG  The Ekg interpreted by me shows  Normal sinus rhythm with a rate of 68  Right bundle branch block  Axis is left  QTc is acceptable    ST Segments: Inferior infarct, age undetermined. Anterolateral infarct, age undetermined. RADIOLOGY  CT HEAD WO CONTRAST    Result Date: 12/22/2021  EXAMINATION: CT OF THE HEAD WITHOUT CONTRAST  12/22/2021 10:19 pm TECHNIQUE: CT of the head was performed without the administration of intravenous contrast. Dose modulation, iterative reconstruction, and/or weight based adjustment of the mA/kV was utilized to reduce the radiation dose to as low as reasonably achievable. COMPARISON: None. HISTORY: ORDERING SYSTEM PROVIDED HISTORY: falls. inj to head. dizziness. room spinnning TECHNOLOGIST PROVIDED HISTORY: Reason for exam:->falls. inj to head. dizziness. room spinnning Has a \"code stroke\" or \"stroke alert\" been called? ->No Decision Support Exception - unselect if not a suspected or confirmed emergency medical condition->Emergency Medical Condition (MA) Reason for Exam: hypotension, fall, dizziness FINDINGS: BRAIN/VENTRICLES: There is mild parenchymal volume loss. There is periventricular white matter low attenuation, likely related to mild chronic microvascular disease. There is no acute intracranial hemorrhage, mass effect or midline shift. No abnormal extra-axial fluid collection. The gray-white differentiation is maintained without evidence of an acute infarct. There is no hydrocephalus. ORBITS: The visualized portion of the orbits demonstrate no acute abnormality. SINUSES: The visualized paranasal sinuses and mastoid air cells demonstrate no acute abnormality. SOFT TISSUES/SKULL:  No acute abnormality of the visualized skull or soft tissues. No acute intracranial abnormality. Mild parenchymal volume loss. Mild chronic microvascular disease.  RECOMMENDATIONS: Unavailable     XR CHEST PORTABLE    Result Date: potentially being harmful to his kidneys. Patient reports he will follow-up with his PCP for further evaluation. CT head obtained and shows no acute intracranial bleed. There is mild parenchymal volume loss and some mild, chronic microvascular disease. COVID-19, influenza a and influenza B were negative. Results of the work-up discussed with patient and wife. Discussed risks, alternatives, and benefits of admission for further evaluation and treatment for CVA and cardiac work-up given his multiple cardiac history and the multiple falls in the past month. However, patient again declines. Wife is comfortable with patient going home. They report they were concerned about not getting a blood pressure read at home and is reassured by his multiple blood pressures readings in the hospital that have been stable. They verbalized understanding that by choosing to leave [address], patient may have MI, stroke, permanent disability, and death. Patient demonstrated capacity to make decisions. Patient chose to leave [address] Pt was seen during the [place] 19 pandemic. Appropriate PPE worn by ME during patient encounters. Pt seen during a time with constrained hospital bed capacity and other potential inpatient and outpatient resources were constrained due to the viral pandemic. During the patient's ED course, the patient was given:  Medications   0.9 % sodium chloride bolus (0 mLs IntraVENous Stopped 12/22/21 2341)        CLINICAL IMPRESSION  1. Dizziness        Blood pressure 128/69, pulse 75, temperature 97.6 °F (36.4 °C), temperature source Temporal, resp. rate 14, height 5' 10\" (1.778 m), weight 230 lb (104.3 kg), SpO2 99 %. DISPOSITION  Estelle Lint - left AMA. Patient was given scripts for the following medications. I counseled patient how to take these medications.    New Prescriptions    No medications on file       Follow-up with:  Breanna Tsnag MD  [address] [facility] [address]  [address] [facility] [address] [facility]]  [phone]    Call in 1 day        DISCLAIMER: This chart was created using Dragon dictation software. Efforts were made by me to ensure accuracy, however some errors may be present due to limitations of this technology and occasionally words are not transcribed correctly.         Erica Cardona MD  12/23/21 0709

## 2021-12-23 NOTE — ED NOTES
Pt states he wishes to leave AMA, spouse at bedside. Dr Angella Dominguez at bedside and discussed risks of leaving AMA such as stroke, permanent disability, and including death. After thorough education on said risks, patient and spouse still state he wants to leave AMA. Patient and spouse voiced complete understanding and state he will follow up with his primary at his next scheduled appointment. Pt signs AMA paper. Pt is A&O x3.       Saint Snowman, RN  12/22/21 2420

## 2022-06-09 PROBLEM — L03.116 CELLULITIS OF LEFT LOWER LIMB: Status: ACTIVE | Noted: 2022-06-09

## 2022-06-09 PROBLEM — K81.0 ACUTE CHOLECYSTITIS: Status: RESOLVED | Noted: 2019-07-17 | Resolved: 2022-06-09

## 2022-06-09 PROBLEM — R41.82 AMS (ALTERED MENTAL STATUS): Status: RESOLVED | Noted: 2020-10-12 | Resolved: 2022-06-09

## 2022-06-09 PROBLEM — C64.9 MALIGNANT NEOPLASM OF KIDNEY EXCLUDING RENAL PELVIS (HCC): Status: ACTIVE | Noted: 2022-06-09

## 2022-06-09 PROBLEM — I73.9 CLAUDICATION (HCC): Status: ACTIVE | Noted: 2022-06-09

## 2022-06-09 PROBLEM — R17 JAUNDICE: Status: RESOLVED | Noted: 2019-07-18 | Resolved: 2022-06-09

## 2022-06-09 PROBLEM — N17.9 AKI (ACUTE KIDNEY INJURY) (HCC): Status: RESOLVED | Noted: 2020-10-14 | Resolved: 2022-06-09

## 2022-06-09 PROBLEM — M72.2 PLANTAR FASCIAL FIBROMATOSIS: Status: ACTIVE | Noted: 2022-06-09

## 2022-06-09 PROBLEM — J30.9 ALLERGIC RHINITIS: Status: ACTIVE | Noted: 2022-06-09

## 2022-06-09 PROBLEM — E11.65 TYPE 2 DIABETES MELLITUS WITH HYPERGLYCEMIA (HCC): Status: ACTIVE | Noted: 2022-06-09

## 2022-06-09 PROBLEM — K80.12 CALCULUS OF GALLBLADDER WITH ACUTE ON CHRONIC CHOLECYSTITIS WITHOUT OBSTRUCTION: Status: RESOLVED | Noted: 2019-07-18 | Resolved: 2022-06-09

## 2022-06-10 ENCOUNTER — HOSPITAL ENCOUNTER (OUTPATIENT)
Dept: WOUND CARE | Age: 82
Discharge: HOME | End: 2022-06-10
Payer: MEDICARE

## 2022-06-10 VITALS
HEIGHT: 70 IN | DIASTOLIC BLOOD PRESSURE: 69 MMHG | HEART RATE: 64 BPM | WEIGHT: 229 LBS | TEMPERATURE: 96.9 F | SYSTOLIC BLOOD PRESSURE: 128 MMHG | BODY MASS INDEX: 32.78 KG/M2 | RESPIRATION RATE: 20 BRPM

## 2022-06-10 DIAGNOSIS — L98.491 ULCER OF ABDOMEN WALL, LIMITED TO BREAKDOWN OF SKIN (HCC): ICD-10-CM

## 2022-06-10 DIAGNOSIS — L98.492 ULCER OF ABDOMEN WALL WITH FAT LAYER EXPOSED (HCC): Primary | ICD-10-CM

## 2022-06-10 PROCEDURE — 97597 DBRDMT OPN WND 1ST 20 CM/<: CPT

## 2022-06-10 PROCEDURE — 97597 DBRDMT OPN WND 1ST 20 CM/<: CPT | Performed by: INTERNAL MEDICINE

## 2022-06-10 PROCEDURE — 99213 OFFICE O/P EST LOW 20 MIN: CPT

## 2022-06-10 PROCEDURE — 11042 DBRDMT SUBQ TIS 1ST 20SQCM/<: CPT

## 2022-06-10 PROCEDURE — 11042 DBRDMT SUBQ TIS 1ST 20SQCM/<: CPT | Performed by: INTERNAL MEDICINE

## 2022-06-10 PROCEDURE — 99203 OFFICE O/P NEW LOW 30 MIN: CPT | Performed by: INTERNAL MEDICINE

## 2022-06-10 RX ORDER — LIDOCAINE 40 MG/G
CREAM TOPICAL ONCE
Status: DISCONTINUED | OUTPATIENT
Start: 2022-06-10 | End: 2022-06-11 | Stop reason: HOSPADM

## 2022-06-10 RX ORDER — BACITRACIN ZINC AND POLYMYXIN B SULFATE 500; 1000 [USP'U]/G; [USP'U]/G
OINTMENT TOPICAL ONCE
Status: DISCONTINUED | OUTPATIENT
Start: 2022-06-10 | End: 2022-06-11 | Stop reason: HOSPADM

## 2022-06-10 RX ORDER — LIDOCAINE 50 MG/G
OINTMENT TOPICAL ONCE
Status: DISCONTINUED | OUTPATIENT
Start: 2022-06-10 | End: 2022-06-11 | Stop reason: HOSPADM

## 2022-06-10 RX ORDER — CEPHALEXIN 500 MG/1
CAPSULE ORAL
COMMUNITY
Start: 2022-06-02 | End: 2022-06-27 | Stop reason: ALTCHOICE

## 2022-06-10 RX ORDER — LIDOCAINE HYDROCHLORIDE 40 MG/ML
SOLUTION TOPICAL ONCE
Status: CANCELLED | OUTPATIENT
Start: 2022-06-10 | End: 2022-06-10

## 2022-06-10 RX ORDER — LIDOCAINE 40 MG/G
CREAM TOPICAL ONCE
Status: CANCELLED | OUTPATIENT
Start: 2022-06-10 | End: 2022-06-10

## 2022-06-10 RX ORDER — LIDOCAINE HYDROCHLORIDE 40 MG/ML
SOLUTION TOPICAL ONCE
Status: DISCONTINUED | OUTPATIENT
Start: 2022-06-10 | End: 2022-06-11 | Stop reason: HOSPADM

## 2022-06-10 RX ORDER — LIDOCAINE 50 MG/G
OINTMENT TOPICAL ONCE
Status: CANCELLED | OUTPATIENT
Start: 2022-06-10 | End: 2022-06-10

## 2022-06-10 RX ORDER — BACITRACIN ZINC AND POLYMYXIN B SULFATE 500; 1000 [USP'U]/G; [USP'U]/G
OINTMENT TOPICAL ONCE
Status: CANCELLED | OUTPATIENT
Start: 2022-06-10 | End: 2022-06-10

## 2022-06-10 ASSESSMENT — PAIN DESCRIPTION - DESCRIPTORS: DESCRIPTORS: ACHING

## 2022-06-10 ASSESSMENT — PAIN SCALES - GENERAL: PAINLEVEL_OUTOF10: 5

## 2022-06-10 ASSESSMENT — PAIN DESCRIPTION - LOCATION: LOCATION: ABDOMEN

## 2022-06-10 ASSESSMENT — PAIN - FUNCTIONAL ASSESSMENT: PAIN_FUNCTIONAL_ASSESSMENT: PREVENTS OR INTERFERES SOME ACTIVE ACTIVITIES AND ADLS

## 2022-06-10 ASSESSMENT — PAIN DESCRIPTION - ORIENTATION: ORIENTATION: LEFT

## 2022-06-10 ASSESSMENT — PAIN DESCRIPTION - FREQUENCY: FREQUENCY: INTERMITTENT

## 2022-06-10 ASSESSMENT — PAIN DESCRIPTION - ONSET: ONSET: ON-GOING

## 2022-06-10 ASSESSMENT — PAIN DESCRIPTION - PAIN TYPE: TYPE: ACUTE PAIN

## 2022-06-10 NOTE — PLAN OF CARE
Pt. New to [facility] today for abdominal wounds that has been present for a couple months. Wound debrided per MD. Apply antibiotic ointment mixed with Triad to wound, cover with dry dressing, change once daily. No further antibiotics needed at this time per Dr Crow Mclean. F/u in [facility] in 1 week as ordered, pt. Aware to call sooner with any changes or questions/concerns. Discharge instructions reviewed with patient, all questions answered, copy given to patient. Dressings were applied to all wounds per M.D. Instructions at this visit.

## 2022-06-16 ENCOUNTER — HOSPITAL ENCOUNTER (OUTPATIENT)
Dept: WOUND CARE | Age: 82
Discharge: HOME | End: 2022-06-16
Payer: MEDICARE

## 2022-06-16 VITALS
SYSTOLIC BLOOD PRESSURE: 139 MMHG | HEIGHT: 70 IN | BODY MASS INDEX: 32.73 KG/M2 | WEIGHT: 228.6 LBS | HEART RATE: 71 BPM | TEMPERATURE: 97 F | RESPIRATION RATE: 18 BRPM | DIASTOLIC BLOOD PRESSURE: 72 MMHG

## 2022-06-16 DIAGNOSIS — L92.9 HYPERGRANULATION: ICD-10-CM

## 2022-06-16 DIAGNOSIS — L98.492 ULCER OF ABDOMEN WALL WITH FAT LAYER EXPOSED (HCC): Primary | ICD-10-CM

## 2022-06-16 PROCEDURE — 17250 CHEM CAUT OF GRANLTJ TISSUE: CPT

## 2022-06-16 PROCEDURE — 17250 CHEM CAUT OF GRANLTJ TISSUE: CPT | Performed by: INTERNAL MEDICINE

## 2022-06-16 PROCEDURE — 97597 DBRDMT OPN WND 1ST 20 CM/<: CPT

## 2022-06-16 PROCEDURE — 11042 DBRDMT SUBQ TIS 1ST 20SQCM/<: CPT | Performed by: INTERNAL MEDICINE

## 2022-06-16 RX ORDER — LIDOCAINE HYDROCHLORIDE 40 MG/ML
SOLUTION TOPICAL ONCE
Status: CANCELLED | OUTPATIENT
Start: 2022-06-16 | End: 2022-06-16

## 2022-06-16 RX ORDER — LIDOCAINE 40 MG/G
CREAM TOPICAL ONCE
Status: CANCELLED | OUTPATIENT
Start: 2022-06-16 | End: 2022-06-16

## 2022-06-16 RX ORDER — LIDOCAINE 50 MG/G
OINTMENT TOPICAL ONCE
Status: CANCELLED | OUTPATIENT
Start: 2022-06-16 | End: 2022-06-16

## 2022-06-16 RX ORDER — BACITRACIN ZINC AND POLYMYXIN B SULFATE 500; 1000 [USP'U]/G; [USP'U]/G
OINTMENT TOPICAL ONCE
Status: CANCELLED | OUTPATIENT
Start: 2022-06-16 | End: 2022-06-16

## 2022-06-16 RX ORDER — LIDOCAINE 40 MG/G
CREAM TOPICAL ONCE
Status: DISCONTINUED | OUTPATIENT
Start: 2022-06-16 | End: 2022-06-17 | Stop reason: HOSPADM

## 2022-06-16 NOTE — PLAN OF CARE
Wound showing signs of improvement this week. Wound debridement and silver nitrate per Dr. Kami Ramires. Follow up in [address] [place] [address] in 1 week as ordered. Pt. Aware to call sooner with any problems or questions/concerns. MD orders/D/C instructions reviewed with patient, all questions answered; copy of instructions given to patient.

## 2022-06-23 ENCOUNTER — HOSPITAL ENCOUNTER (OUTPATIENT)
Dept: WOUND CARE | Age: 82
Discharge: HOME | End: 2022-06-23
Payer: MEDICARE

## 2022-06-23 VITALS
SYSTOLIC BLOOD PRESSURE: 108 MMHG | TEMPERATURE: 97.6 F | RESPIRATION RATE: 16 BRPM | HEART RATE: 65 BPM | WEIGHT: 229 LBS | BODY MASS INDEX: 32.78 KG/M2 | HEIGHT: 70 IN | DIASTOLIC BLOOD PRESSURE: 76 MMHG

## 2022-06-23 DIAGNOSIS — L98.492 ULCER OF ABDOMEN WALL WITH FAT LAYER EXPOSED (HCC): Primary | ICD-10-CM

## 2022-06-23 PROCEDURE — 97597 DBRDMT OPN WND 1ST 20 CM/<: CPT

## 2022-06-23 PROCEDURE — 17250 CHEM CAUT OF GRANLTJ TISSUE: CPT

## 2022-06-23 PROCEDURE — 97597 DBRDMT OPN WND 1ST 20 CM/<: CPT | Performed by: INTERNAL MEDICINE

## 2022-06-23 RX ORDER — BACITRACIN ZINC AND POLYMYXIN B SULFATE 500; 1000 [USP'U]/G; [USP'U]/G
OINTMENT TOPICAL ONCE
Status: CANCELLED | OUTPATIENT
Start: 2022-06-23 | End: 2022-06-23

## 2022-06-23 RX ORDER — LIDOCAINE 40 MG/G
CREAM TOPICAL ONCE
Status: DISCONTINUED | OUTPATIENT
Start: 2022-06-23 | End: 2022-06-24 | Stop reason: HOSPADM

## 2022-06-23 RX ORDER — LIDOCAINE 40 MG/G
CREAM TOPICAL ONCE
Status: CANCELLED | OUTPATIENT
Start: 2022-06-23 | End: 2022-06-23

## 2022-06-23 RX ORDER — LIDOCAINE 50 MG/G
OINTMENT TOPICAL ONCE
Status: CANCELLED | OUTPATIENT
Start: 2022-06-23 | End: 2022-06-23

## 2022-06-23 RX ORDER — LIDOCAINE HYDROCHLORIDE 40 MG/ML
SOLUTION TOPICAL ONCE
Status: CANCELLED | OUTPATIENT
Start: 2022-06-23 | End: 2022-06-23

## 2022-06-23 ASSESSMENT — PAIN DESCRIPTION - PAIN TYPE: TYPE: ACUTE PAIN

## 2022-06-23 ASSESSMENT — PAIN DESCRIPTION - ONSET: ONSET: ON-GOING

## 2022-06-23 ASSESSMENT — PAIN - FUNCTIONAL ASSESSMENT: PAIN_FUNCTIONAL_ASSESSMENT: ACTIVITIES ARE NOT PREVENTED

## 2022-06-23 ASSESSMENT — PAIN DESCRIPTION - LOCATION: LOCATION: ABDOMEN

## 2022-06-23 ASSESSMENT — PAIN SCALES - GENERAL: PAINLEVEL_OUTOF10: 3

## 2022-06-23 ASSESSMENT — PAIN DESCRIPTION - FREQUENCY: FREQUENCY: INTERMITTENT

## 2022-06-23 NOTE — PLAN OF CARE
Wounds showing signs of improvement. Wound debridement per Dr. Brigitte James. Follow up in [address] [place] [address] in 1 week as ordered. Pt. Aware to call sooner with any problems or questions/concerns. MD orders/D/C instructions reviewed with patient, all questions answered; copy of instructions given to patient.

## 2022-06-27 PROBLEM — L98.491 ULCER OF ABDOMEN WALL, LIMITED TO BREAKDOWN OF SKIN (HCC): Status: ACTIVE | Noted: 2022-06-27

## 2022-06-27 PROBLEM — C64.9 MALIGNANT NEOPLASM OF KIDNEY EXCLUDING RENAL PELVIS (HCC): Status: RESOLVED | Noted: 2022-06-09 | Resolved: 2022-06-27

## 2022-06-27 PROBLEM — L03.311 CELLULITIS, ABDOMINAL WALL: Status: ACTIVE | Noted: 2022-06-09

## 2022-06-27 PROBLEM — E11.65 TYPE 2 DIABETES MELLITUS WITH HYPERGLYCEMIA (HCC): Status: RESOLVED | Noted: 2022-06-09 | Resolved: 2022-06-27

## 2022-06-27 PROBLEM — L92.9 HYPERGRANULATION: Status: ACTIVE | Noted: 2022-06-27

## 2022-06-27 PROBLEM — L03.311 CELLULITIS, ABDOMINAL WALL: Status: RESOLVED | Noted: 2022-06-09 | Resolved: 2022-06-27

## 2022-06-27 NOTE — PROGRESS NOTES
[facility] Progress Note    Iza Hester     : 1940    DATE OF VISIT:  2022    Subjective:     Iza Hester is a 81 y.o. male who has an infection-associated ulcer located on the abdomen (on the upper left). Significant symptoms or pertinent wound history since last visit: feeling ok overall, a bit less pain, maybe more drainage this week (after the eschar was removed from the larger wound last week). No F/C/D, no delayed adverse effects from Abx. Less pruritus around the wounds. Additional ulcer(s) noted? no.      His current medication list consists of Carvedilol, Insulin Degludec, aspirin, omeprazole, rOPINIRole, and simvastatin. Allergies: Patient has no known allergies. Objective:     Vitals:    22 1411   BP: 139/72   Pulse: 71   Resp: 18   Temp: 97 °F (36.1 °C)   TempSrc: Oral   Weight: 228 lb 9.6 oz (103.7 kg)   Height: 5' 10\" (1.778 m)     Constitutional:  well-developed, well-nourished, overweight, NAD  Psychiatric:  oriented to person, place and time; mood and affect appropriate for the situation   No icterus, thrush, drug rash, abd tenderness  Abd: soft, NT (outside the wounds), ND, good BS  Lymphatic:  no inguinal adenopathy   [place]-ulcer skin: indurated, pink, less erythematous, warm and improving dermatitis I think from drainage or recent dressings. Ulcer(s): one small medial ulcer pink-red, hypergranular, clean; the larger wound to the left side has resolved eschar, more granulation tissue, less fat necrosis, a couple of small pockets of depth, still some fibrin and biofilm. Photos also saved in electronic chart.     Today's wound measurements, per RN documentation:  Wound 06/10/22 #2 Left Lateral Abdomen, Infection, Full Thickness, Onset 2022-Wound Length (cm): 1.9 cm  [REMOVED] Wound 06/10/22 # 1 Left Medial Abdomen, Infection,Partial Thickness, Onset 2022-Wound Length (cm): 0.9 cm    Wound 06/10/22 #2 Left Lateral Abdomen, Infection, Full Thickness, Onset 04/2022-Wound Width (cm): 3.9 cm  [REMOVED] Wound 06/10/22 # 1 Left Medial Abdomen, Infection,Partial Thickness, Onset 04/2022-Wound Width (cm): 1 cm    Wound 06/10/22 #2 Left Lateral Abdomen, Infection, Full Thickness, Onset 04/2022-Wound Depth (cm): 0.2 cm  [REMOVED] Wound 06/10/22 # 1 Left Medial Abdomen, Infection,Partial Thickness, Onset 04/2022-Wound Depth (cm): 0.1 cm    Assessment:     Patient Active Problem List   Diagnosis Code    HTN (hypertension), benign I10    Acid reflux K21.9    Hypercholesteremia E78.00    Diabetic peripheral neuropathy associated with type 2 diabetes mellitus ([facility]) E11.42    History of aortic valve replacement with bioprosthetic valve Z95.3    ROOSEVELT (obstructive sleep apnea) G47.33    CAD S/P percutaneous coronary angioplasty I25.10, Z98.61    S/P left atrial appendage ligation Z98.890    H/O partial nephrectomy Z90.5    Seizure-like activity ([facility]) R56.9    CKD stage 3 due to type 2 diabetes mellitus ([facility]) E11.22, N18.30    Allergic rhinitis J30.9    Benign prostatic hyperplasia N40.0    Claudication ([facility]) I73.9    Obesity E66.9    Plantar fascial fibromatosis M72.2    Restless legs syndrome G25.81    Stenosis of left subclavian artery ([facility]) I77.1    Ulcer of abdomen wall with fat layer exposed ([address] L98.492    Ulcer of abdomen wall, limited to breakdown of skin ([address] L98.491    Hypergranulation L92.9       Assessment of today's active condition(s): recent abdominal wall abscess (carbuncle?) and cellulitis, infection resolved, two wounds remaining, one clean and hypergranular, the other still with some necrosis, but improving granulation tissue. Factors contributing to occurrence and/or persistence of the chronic ulcer include diabetes. Medical necessity of today's visit is shown by the above documentation. Sharp debridement is indicated today, based upon the exam findings in the wound(s) above. Procedure note:     Consent obtained.  Time out performed per [facility]. Anesthetic  Anesthetic: 4% Lidocaine Cream     Using a curette and #15 blade scalpel, I sharply debrided the larger left sided abdominal wall ulcer(s) down through and including the removal of subcutaneous tissue. The type(s) of tissue debrided included fibrin, biofilm and necrotic/eschar. Total Surface Area Debrided: 8 sq cm. The ulcers were then irrigated with normal saline solution. The procedure was completed with a small amount of bleeding, and hemostasis was with pressure. The patient tolerated the procedure well, with no significant complications. The patient's level of pain during and after the procedure was monitored. Post-debridement measurements, if different from pre-debridement, are in the flowsheet as well.  ______________    To encourage better epithelial cell coverage, I did use AgNO3 to chemically cauterize hypergranulation tissue on the smaller, more midline abdominal wall ulcer(s), after application of 4% lidocaine topical solution. This was tolerated well, with no pain or skin injury. Discharge plan:     Treatment in the wound care center today, per RN documentation: Wound 06/10/22 #2 Left Lateral Abdomen, Infection, Full Thickness, Onset 04/2022-Dressing/Treatment: Other (comment) (triad/PSO,4x4,tape)  [REMOVED] Wound 06/10/22 # 1 Left Medial Abdomen, Infection,Partial Thickness, Onset 04/2022-Dressing/Treatment: Other (comment) (triad/PSO,4x4,tape). Home treatment: good handwashing before and after any dressing changes. Cleanse wound with saline or soap & water before dressing change. May use Vaseline (petrolatum), Aquaphor, Aveeno, CeraVe, Cetaphil, Eucerin, Lubriderm, etc for dry skin. Dressing type for home: as above, once daily. Written discharge instructions given to patient. Follow up in 1 week.     Electronically signed by Yanique López MD on 6/27/2022 at 3:30 PM.

## 2022-06-27 NOTE — CONSULTS
[facility] Consult Note    Beth Nation     : 1940    DATE OF VISIT:  6/10/2022    Subjective:     Beth Nation is a 81 y.o. male who has an infection-associated ulcer located on the abdomen (on the upper left). Current complaint of pain in this ulcer? yes. Quality of pain: aching and sharp  Timing: intermittent and stable  Severity: mild-moderate  Associated Signs/Symptoms:  redness and drainage (moderate, serous to serosanguinous)  Other significant symptoms or pertinent ulcer history: it sounds like Mr. Jose Enrique Zhao' wound started as a skin-SQ abscess at least 6 weeks ago or so. He had a course of antibiotics, with a bit of improvement in the area, but two persistent areas of necrotic ulceration on the abdominal wall. He's had a number of similar abscesses in the past, but has not really had the same problem with nonhealing wounds afterwards. He does have DM, but glucoses are generally very well controlled. Current local care is sometimes a topical antibiotic, sometimes just a dry dressing. No fever. Tolerated Abx well, no sore throat or mouth, no rash, N/V/D. Additional ulcer(s) noted? no.      Mr. Jose Enrique Zhao has a past medical history of Achilles tendon tear, Acid reflux, Arthritis, CAD S/P percutaneous coronary angioplasty, Calculus of gallbladder with acute on chronic cholecystitis without obstruction, Cerebrovascular accident (CVA) ([address] Diabetes mellitus ([address] Diabetic ulcer of left foot associated with type 2 diabetes mellitus ([address] Dysphagia, Heart murmur, Hypercholesteremia, Hypertension, Malignant neoplasm of kidney excluding renal pelvis ([address] ROOSEVELT (obstructive sleep apnea), and Right renal mass. He has a past surgical history that includes Esophagus dilation; Aortic valve replacement; other surgical history (1/30/15); Kidney biopsy (); Colonoscopy;  Foot surgery; other surgical history (Right, 2017); ERCP (N/A, 2019); ERCP (7/22/2019); Cholecystectomy, laparoscopic (N/A, 8/16/2019); Cardiac surgery; Cardiac valuve replacement (2013); Cardiac surgery (2013); eye surgery (Left); and eye surgery (Right, 6/4/13). His family history includes Diabetes in his mother; Heart Disease in his father and mother. Mr. Samir Quinteros reports that he quit smoking about 24 years ago. He has a 30.00 pack-year smoking history. He quit smokeless tobacco use about 37 years ago. His smokeless tobacco use included chew. He reports that he does not drink alcohol and does not use drugs. His current medication list consists of Carvedilol, Insulin Degludec, aspirin, omeprazole, rOPINIRole, and simvastatin. A course of Keflex just recently completed, I think. Allergies: Patient has no known allergies. Pertinent items from the review of systems are discussed in the HPI; the remainder of the ROS was reviewed and is negative. Objective:     Vitals:    06/10/22 0810   BP: 128/69   Pulse: 64   Resp: 20   Temp: 96.9 °F (36.1 °C)   TempSrc: Oral   Weight: 229 lb (103.9 kg)   Height: 5' 10\" (1.778 m)       Constitutional:  well-developed, well-nourished, overweight, NAD  Psychiatric:  oriented to person, place and time; mood and affect appropriate for the situation   Eyes:  pupils equal, round and reactive to light; sclerae anicteric, conjunctivae not pale  ENT: no thrush or oral ulcers, mucous membranes moist  Abd: soft, NT (outside the wounds), ND, good BS  Lymphatic:  no inguinal adenopathy   [place]-ulcer skin: indurated, pink, erythematous , warm and mild dermatitis I think from drainage or recent dressings. Ulcer(s): one small medial ulcer pink-red, granular, fibrin, biofilm; the larger wound to the left side is primarily just a black eschar at this point, with some fat necrosis and biofilm at the periphery, a bit difficult to determine depth right away, no obvious pus. Photos also saved in electronic chart.     Today's Wound Measurements, per RN documentation:  [REMOVED] Wound 06/10/22 # 1 Left Medial Abdomen, Infection,Partial Thickness, Onset 04/2022-Wound Length (cm): 1.1 cm  Wound 06/10/22 #2 Left Lateral Abdomen, Infection, Full Thickness, Onset 04/2022-Wound Length (cm): 2 cm    [REMOVED] Wound 06/10/22 # 1 Left Medial Abdomen, Infection,Partial Thickness, Onset 04/2022-Wound Width (cm): 1.5 cm  Wound 06/10/22 #2 Left Lateral Abdomen, Infection, Full Thickness, Onset 04/2022-Wound Width (cm): 2.5 cm    [REMOVED] Wound 06/10/22 # 1 Left Medial Abdomen, Infection,Partial Thickness, Onset 04/2022-Wound Depth (cm): 0.1 cm  Wound 06/10/22 #2 Left Lateral Abdomen, Infection, Full Thickness, Onset 04/2022-Wound Depth (cm): 0.1 cm  _____________________________    Lab Results   Component Value Date    LABALBU 3.7 12/22/2021     Lab Results   Component Value Date    CREATININE 1.9 (H) 12/22/2021     Lab Results   Component Value Date    HGB 14.8 12/22/2021     Lab Results   Component Value Date    LABA1C 7.1 10/13/2020     Assessment:     Patient Active Problem List   Diagnosis Code    HTN (hypertension), benign I10    Acid reflux K21.9    Hypercholesteremia E78.00    Diabetic peripheral neuropathy associated with type 2 diabetes mellitus ([facility] E11.42    History of aortic valve replacement with bioprosthetic valve Z95.3    ROOSEVELT (obstructive sleep apnea) G47.33    CAD S/P percutaneous coronary angioplasty I25.10, Z98.61    S/P left atrial appendage ligation Z98.890    H/O partial nephrectomy Z90.5    Seizure-like activity ([facility] R56.9    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, N18.30    Allergic rhinitis J30.9    Benign prostatic hyperplasia N40.0    Cellulitis, abdominal wall L03.311    Claudication ([facility]) I73.9    Obesity E66.9    Plantar fascial fibromatosis M72.2    Restless legs syndrome G25.81    Stenosis of left subclavian artery ([facility]) I77.1    Type 2 diabetes mellitus with hyperglycemia ([facility]) E11.65    Ulcer of abdomen wall with fat layer exposed ([facility] L98.492       Assessment of today's active condition(s): recent abdominal wall abscess (carbuncle?), with infection seeming to likely be resolved, but two areas of residual open wound, a bit of superficial debris on the smaller one, but a denser eschar on the larger one. Factors contributing to occurrence and/or persistence of the chronic ulcer include diabetes. Medical necessity of today's visit is shown by the above documentation. Sharp debridement is indicated today, based upon the exam findings in the ulcer(s) above. Procedure note:     Consent obtained. Time out performed per [facility]. Anesthetic  Anesthetic: 4% Lidocaine Cream     Using a #15 blade scalpel, I sharply debrided the smaller and more superficial abdomen ulcer(s) down through and including the removal of dermis. The type(s) of tissue debrided included fibrin and necrotic/eschar. Total Surface Area Debrided: 1 sq cm. Using a curette, #15 blade scalpel and forceps, I sharply debrided the larger abdominal wall ulcer(s) down through and including the removal of subcutaneous tissue. The type(s) of tissue debrided included fibrin, biofilm, slough and necrotic/eschar. Total Surface Area Debrided: 5 sq cm. Beneath that eschar was a bit of moist fat necrosis, a few small pockets of depth, but some developing granulation tissue, no pus. The ulcers were then irrigated with normal saline solution. The procedure was completed with a small amount of bleeding, and hemostasis was with pressure and with silver nitrate stick(s). The patient tolerated the procedure well, with no significant complications. The patient's level of pain during and after the procedure was monitored. Post-debridement measurements, if different from pre-debridement, are in the flowsheet as well.      Discharge plan:     Treatment in the wound care center today, per RN documentation: [REMOVED] Wound 06/10/22 # 1 Left Medial Abdomen, Infection,Partial Thickness, Onset 04/2022-Dressing/Treatment:  (Triad/ PSO. gauze, tape)  Wound 06/10/22 #2 Left Lateral Abdomen, Infection, Full Thickness, Onset 04/2022-Dressing/Treatment:  (Triad/ PSO. gauze, tape). No additional Abx needed. Ok to shower daily, just do the dressing change right afterwards. Apart from the Triad dressing, which we can give him a sample of, he tells me that he has adequate supplies at home for local care. Keep up the good work with glucose control. Home treatment: good handwashing before and after any dressing changes. Cleanse ulcer with saline or soap & water before dressing change. May use Vaseline (petrolatum), Aquaphor, Aveeno, CeraVe, Cetaphil, Eucerin, Lubriderm, etc for dry skin. Dressing type for home: as above, once daily. Written discharge instructions given to patient. Follow up in 1 week.     Electronically signed by Harmony Kimble MD on 6/27/2022 at 3:03 PM.

## 2022-06-27 NOTE — PROGRESS NOTES
[facility] Progress Note    Fredrick Bose     : 1940     DATE OF VISIT:  2022    Subjective:     Fredrick Bose is a 81 y.o. male who has an infection-associated ulcer located on the abdomen (on the upper left). Significant symptoms or pertinent wound history since last visit: feeling well overall, mild pain, no fever, mild pruritus, not much drainage, doing well with current dressings. Additional ulcer(s) noted? no. And the smaller of the two wounds looks delicately healed this week. His current medication list consists of Carvedilol, Insulin Degludec, aspirin, omeprazole, rOPINIRole, and simvastatin. Allergies: Patient has no known allergies. Objective:     Vitals:    22 1509   BP: 108/76   Pulse: 65   Resp: 16   Temp: 97.6 °F (36.4 °C)   TempSrc: Oral   Weight: 229 lb (103.9 kg)   Height: 5' 10\" (1.778 m)     Constitutional:  well-developed, well-nourished, overweight, NAD  Psychiatric:  oriented to person, place and time; mood and affect appropriate for the situation   Abd: soft, NT (outside the wounds), ND, good BS  Lymphatic:  no inguinal adenopathy   [place]-ulcer skin: indurated, pink, less erythematous, warm and improving dermatitis I think from drainage or recent dressings. Ulcer(s): one small medial ulcer looks very delicately healed; the larger wound to the left side has resolved eschar, more granulation tissue, some peripheral hypergranulation now, some fibrin and biofilm, less focal depth, no definite fat necrosis, no signs of infection. Photos also saved in electronic chart.     Today's wound measurements, per RN documentation:  Wound 06/10/22 #2 Left Lateral Abdomen, Infection, Full Thickness, Onset 2022-Wound Length (cm): 1.5 cm  [REMOVED] Wound 06/10/22 # 1 Left Medial Abdomen, Infection,Partial Thickness, Onset 2022-Wound Length (cm): 0 cm    Wound 06/10/22 #2 Left Lateral Abdomen, Infection, Full Thickness, Onset 2022-Wound Width (cm): 3.5 cm  [REMOVED] Wound 06/10/22 # 1 Left Medial Abdomen, Infection,Partial Thickness, Onset 04/2022-Wound Width (cm): 0 cm    Wound 06/10/22 #2 Left Lateral Abdomen, Infection, Full Thickness, Onset 04/2022-Wound Depth (cm): 0.1 cm  [REMOVED] Wound 06/10/22 # 1 Left Medial Abdomen, Infection,Partial Thickness, Onset 04/2022-Wound Depth (cm): 0 cm    Assessment:     Patient Active Problem List   Diagnosis Code    HTN (hypertension), benign I10    Acid reflux K21.9    Hypercholesteremia E78.00    Diabetic peripheral neuropathy associated with type 2 diabetes mellitus ([facility]) E11.42    History of aortic valve replacement with bioprosthetic valve Z95.3    ROOSEVELT (obstructive sleep apnea) G47.33    CAD S/P percutaneous coronary angioplasty I25.10, Z98.61    S/P left atrial appendage ligation Z98.890    H/O partial nephrectomy Z90.5    Seizure-like activity ([facility]) R56.9    CKD stage 3 due to type 2 diabetes mellitus ([facility]) E11.22, N18.30    Allergic rhinitis J30.9    Benign prostatic hyperplasia N40.0    Claudication ([facility]) I73.9    Obesity E66.9    Plantar fascial fibromatosis M72.2    Restless legs syndrome G25.81    Stenosis of left subclavian artery ([facility]) I77.1    Ulcer of abdomen wall with fat layer exposed ([address] L98.492    Ulcer of abdomen wall, limited to breakdown of skin ([address] L98.491    Hypergranulation L92.9       Assessment of today's active condition(s): Hx recent abdominal wall abscess (carbuncle?), cellulitis, infection resolved, but post-infectious necrotic ulcers, smaller one healed, larger one making some progress. Factors contributing to occurrence and/or persistence of the chronic ulcer include diabetes. Medical necessity of today's visit is shown by the above documentation. Sharp debridement is indicated today, based upon the exam findings in the wound(s) above. Procedure note:     Consent obtained. Time out performed per [facility].     Anesthetic  Anesthetic: 4% Lidocaine Cream     Using a curette, I sharply debrided the abdomen (on the upper left) ulcer(s) down through and including the removal of dermis. The type(s) of tissue debrided included fibrin and biofilm. Total Surface Area Debrided: 5 sq cm. The ulcers were then irrigated with normal saline solution. The procedure was completed with a small amount of bleeding, and hemostasis was with pressure. The patient tolerated the procedure well, with no significant complications. The patient's level of pain during and after the procedure was monitored. Post-debridement measurements, if different from pre-debridement, are in the flowsheet as well.  ______________    To encourage better epithelial cell coverage, I did use AgNO3 to chemically cauterize hypergranulation tissue on the abdomen (on the upper left) ulcer(s), after application of 4% lidocaine topical solution. This was tolerated well, with no pain or skin injury. Discharge plan:     Treatment in the wound care center today, per RN documentation: Wound 06/10/22 #2 Left Lateral Abdomen, Infection, Full Thickness, Onset 04/2022-Dressing/Treatment: Other (comment) (triad PSO 4x4's medipore tape). Home treatment: good handwashing before and after any dressing changes. Cleanse wound with saline or soap & water before dressing change. May use Vaseline (petrolatum), Aquaphor, Aveeno, CeraVe, Cetaphil, Eucerin, Lubriderm, etc for dry skin. Dressing type for home: as above, once daily. Written discharge instructions given to patient. Follow up in 2 weeks, but call sooner with concerns or questions.     Electronically signed by Stella Tenorio MD on 6/27/2022 at 3:43 PM.

## 2022-07-07 ENCOUNTER — HOSPITAL ENCOUNTER (OUTPATIENT)
Dept: WOUND CARE | Age: 82
Discharge: HOME | End: 2022-07-07
Payer: MEDICARE

## 2022-07-07 VITALS
WEIGHT: 223.4 LBS | SYSTOLIC BLOOD PRESSURE: 133 MMHG | TEMPERATURE: 96.6 F | RESPIRATION RATE: 18 BRPM | HEIGHT: 70 IN | HEART RATE: 72 BPM | DIASTOLIC BLOOD PRESSURE: 74 MMHG | BODY MASS INDEX: 31.98 KG/M2

## 2022-07-07 DIAGNOSIS — L92.9 HYPERGRANULATION: ICD-10-CM

## 2022-07-07 DIAGNOSIS — L98.491 ULCER OF ABDOMEN WALL, LIMITED TO BREAKDOWN OF SKIN (HCC): Primary | ICD-10-CM

## 2022-07-07 DIAGNOSIS — L98.492 ULCER OF ABDOMEN WALL WITH FAT LAYER EXPOSED (HCC): ICD-10-CM

## 2022-07-07 PROCEDURE — 97597 DBRDMT OPN WND 1ST 20 CM/<: CPT | Performed by: INTERNAL MEDICINE

## 2022-07-07 PROCEDURE — 97597 DBRDMT OPN WND 1ST 20 CM/<: CPT

## 2022-07-07 RX ORDER — LIDOCAINE HYDROCHLORIDE 40 MG/ML
SOLUTION TOPICAL ONCE
Status: DISCONTINUED | OUTPATIENT
Start: 2022-07-07 | End: 2022-07-08 | Stop reason: HOSPADM

## 2022-07-07 RX ORDER — LIDOCAINE 40 MG/G
CREAM TOPICAL ONCE
Status: DISCONTINUED | OUTPATIENT
Start: 2022-07-07 | End: 2022-07-08 | Stop reason: HOSPADM

## 2022-07-07 RX ORDER — LIDOCAINE 40 MG/G
CREAM TOPICAL ONCE
Status: CANCELLED | OUTPATIENT
Start: 2022-07-07 | End: 2022-07-07

## 2022-07-07 RX ORDER — LIDOCAINE HYDROCHLORIDE 40 MG/ML
SOLUTION TOPICAL ONCE
Status: CANCELLED | OUTPATIENT
Start: 2022-07-07 | End: 2022-07-07

## 2022-07-07 RX ORDER — BACITRACIN ZINC AND POLYMYXIN B SULFATE 500; 1000 [USP'U]/G; [USP'U]/G
OINTMENT TOPICAL ONCE
Status: CANCELLED | OUTPATIENT
Start: 2022-07-07 | End: 2022-07-07

## 2022-07-07 RX ORDER — LIDOCAINE 50 MG/G
OINTMENT TOPICAL ONCE
Status: CANCELLED | OUTPATIENT
Start: 2022-07-07 | End: 2022-07-07

## 2022-07-07 RX ORDER — LIDOCAINE 50 MG/G
OINTMENT TOPICAL ONCE
Status: DISCONTINUED | OUTPATIENT
Start: 2022-07-07 | End: 2022-07-08 | Stop reason: HOSPADM

## 2022-07-07 RX ORDER — BACITRACIN ZINC AND POLYMYXIN B SULFATE 500; 1000 [USP'U]/G; [USP'U]/G
OINTMENT TOPICAL ONCE
Status: DISCONTINUED | OUTPATIENT
Start: 2022-07-07 | End: 2022-07-08 | Stop reason: HOSPADM

## 2022-07-07 ASSESSMENT — PAIN SCALES - GENERAL: PAINLEVEL_OUTOF10: 0

## 2022-07-07 NOTE — PLAN OF CARE
Wound stable & showing improvement, debridement per MD & pt. Tolerated well. Will stop using Triad. Pt. To cont. With antibiotic ointment, cover with dry dressing, change daily. Pt. Instructed to not let wound dry out. F/u in [facility] in 2 weeks as ordered, pt. Aware to call sooner with any changes or questions/concerns. Discharge instructions reviewed with patient, all questions answered, copy given to patient. Dressings were applied to all wounds per M.D. Instructions at this visit.

## 2022-07-14 PROBLEM — L98.491 ULCER OF ABDOMEN WALL, LIMITED TO BREAKDOWN OF SKIN (HCC): Status: RESOLVED | Noted: 2022-06-27 | Resolved: 2022-07-14

## 2022-07-14 NOTE — PROGRESS NOTES
Best 30 Progress Note    Ld Zambrano     : 1940    DATE OF VISIT:  2022    Subjective:     Ld Zambrano is a 81 y.o. male who has an infection-associated ulcer located on the abdomen (on the upper left). Significant symptoms or pertinent wound history since last visit: feeling well overall, mild pain, mild pruritus from dressings, less drainage, no fever. Additional ulcer(s) noted? no. One healed up, one getting closer. His current medication list consists of Carvedilol, Insulin Degludec, aspirin, omeprazole, rOPINIRole, and simvastatin. Allergies: Patient has no known allergies. Objective:     Vitals:    22 1406 22 1412 22 1413   BP:   133/74   Pulse:   72   Resp:  18    Temp:  (!) 96.6 °F (35.9 °C)    TempSrc:  Oral    Weight: 223 lb 6.4 oz (101.3 kg)     Height: 5' 10\" (1.778 m)       Constitutional:  well-developed, well-nourished, overweight, NAD  Psychiatric:  oriented to person, place and time; mood and affect appropriate for the situation   Abd: soft, NT (outside the wounds), ND, good BS  Lymphatic:  no inguinal adenopathy   [place]-ulcer skin: indurated, pink, warm and mild dermatitis, I think from drainage or recent dressings. Ulcer(s): one small medial ulcer more obviously healed; the larger wound to the left side has is still open, but smaller, less depth, mostly red granulation tissue, a bit of fibrin, biofilm, crusted debris, no pus, no worrisome depth. Photos also saved in electronic chart.     Today's wound measurements, per RN documentation:  Wound 06/10/22 #2 Left Lateral Abdomen, Infection, Full Thickness, Onset 2022-Wound Length (cm): 0.7 cm    Wound 06/10/22 #2 Left Lateral Abdomen, Infection, Full Thickness, Onset 2022-Wound Width (cm): 2.2 cm    Wound 06/10/22 #2 Left Lateral Abdomen, Infection, Full Thickness, Onset 2022-Wound Depth (cm): 0.1 cm    Assessment:     Patient Active Problem List   Diagnosis Code  HTN (hypertension), benign I10    Acid reflux K21.9    Hypercholesteremia E78.00    Diabetic peripheral neuropathy associated with type 2 diabetes mellitus (HCC) E11.42    History of aortic valve replacement with bioprosthetic valve Z95.3    ROOSEVELT (obstructive sleep apnea) G47.33    CAD S/P percutaneous coronary angioplasty I25.10, Z98.61    S/P left atrial appendage ligation Z98.890    H/O partial nephrectomy Z90.5    Seizure-like activity ([facility]) R56.9    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, 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    Allergic rhinitis J30.9    Benign prostatic hyperplasia N40.0    Claudication ([facility]) I73.9    Obesity E66.9    Plantar fascial fibromatosis M72.2    Restless legs syndrome G25.81    Stenosis of left subclavian artery (HCC) I77.1    Ulcer of abdomen wall with fat layer exposed ([address] L98.492    Hypergranulation L92.9       Assessment of today's active condition(s): recent abdominal wall abscesses / carbuncle, completed Abx, but with two necrotic ulcers left behind; one of them healed with minor debridement and simple dressings, and the other one getting closer; no signs of active infection right now. Factors contributing to occurrence and/or persistence of the chronic ulcer include diabetes and obesity. Medical necessity of today's visit is shown by the above documentation. Sharp debridement is indicated today, based upon the exam findings in the wound(s) above. Procedure note:     Consent obtained. Time out [facility] policy. Anesthetic  Anesthetic: 4% Lidocaine Cream     Using a # 10 blade scalpel, I sharply debrided the abdomen (on the upper left) ulcer(s) down through and including the removal of dermis. The type(s) of tissue debrided included fibrin, biofilm, necrotic/eschar and exudate. Total Surface Area Debrided: 2 sq cm. The ulcers were then irrigated with normal saline solution.  The procedure was completed with a small amount of bleeding, and hemostasis was with pressure. The patient tolerated the procedure well, with no significant complications. The patient's level of pain during and after the procedure was monitored. Post-debridement measurements, if different from pre-debridement, are in the flowsheet as well. Discharge plan:     Treatment in the wound care center today, per RN documentation: Wound 06/10/22 #2 Left Lateral Abdomen, Infection, Full Thickness, Onset 04/2022-Dressing/Treatment: Antibacterial ointment,Other (comment) (PSO,4x4's,tape). Can stop Triad at this point, with no real necrotic or sloughy material left, and the wound bed looking a bit dry. Ok to shower. No additional Abx needed. Home treatment: good handwashing before and after any dressing changes. Cleanse wound with saline or soap & water before dressing change. May use Vaseline (petrolatum), Aquaphor, Aveeno, CeraVe, Cetaphil, Eucerin, Lubriderm, etc for dry skin. Dressing type for home: as above, once daily. Written discharge instructions given to patient. Follow up in 2 weeks, if he's not healed by that time, but we can have at least a bit of phone follow-up next week, hopefully to be reassured that he's almost healed.     Electronically signed by Lois Garnica MD on 7/14/2022 at 2:19 PM.

## 2022-07-15 NOTE — DISCHARGE INSTRUCTIONS
215 [facility] Physician Orders and Discharge 800 [facility]  [address], [address]  [place], [facility]  Telephone: [phone]      Fax: [phone] home care company:   N/a . Your wound-care supplies will be provided by:  Patient . NAME:  Maria Elena Forrest   YOB: 1940  PRIMARY DIAGNOSIS FOR WOUND CARE CENTER:  Infection . Wound cleansing:  Do not scrub or use excessive force. Wash hands with soap and water before and after dressing changes. Prior to applying a clean dressing, cleanse wound with normal saline, wound cleanser, or mild soap and water. Ask your physician or nurse before getting the wound(s) wet in the shower. Wound care for home:     Abdominal wound:  Antibiotic ointment to wound  Cover with 4x4's, tape  Change dressing once daily         Please note, all wounds (unless stated otherwise here) were mechanically debrided at the time of cleansing here in the wound-care center today, so a small amount of pain, drainage or bleeding from that process might be expected, and is normal.     All products for home use, including multiple products for a single wound if applicable, are medically necessary in order to achieve the best chance at timely wound healing. See provider documentation for details if needed. Substituted dressings applied in the [facility] today, if applicable:     N/a     New orders for this week (labs, imaging, medications, etc.):     Leave wound covered with dressing at all times. Do not let wound dry out. Additional instructions for specific diagnoses:     N/a     F/U Appointment is with Dr. Evita Lee in 2 weeks, on                                   at                       .     Your nurse  is JEFF Baxter.      If we applied slip-resistant hospital socks today, be sure to remove them at least once a day to inspect your toes or feet, even if you're not changing the wraps or dressings underneath. If you see anything concerning (redness, excess moisture, etc), please call and let us know right away. Should you experience any significant changes in your wound(s) (including redness, increased warmth, increased pain, increased drainage, odor, or fever) or have questions about your wound care, please contact the [facility]  at [phone] Monday-Thursday from 8:00 am - 4:30 pm, or Friday from 8:00 am - 2:30 pm.  If you need help with your wound outside these hours and cannot wait until we are again available, contact your home-care company (if applicable), your PCP, or go to the nearest emergency room.

## 2022-07-21 ENCOUNTER — HOSPITAL ENCOUNTER (OUTPATIENT)
Dept: WOUND CARE | Age: 82
Discharge: HOME | End: 2022-07-21

## 2022-07-21 DIAGNOSIS — L98.492 ULCER OF ABDOMEN WALL WITH FAT LAYER EXPOSED (HCC): ICD-10-CM

## 2022-07-21 DIAGNOSIS — L92.9 HYPERGRANULATION: Primary | ICD-10-CM

## 2022-07-29 NOTE — DISCHARGE INSTRUCTIONS
215 [facility] Physician Orders and Discharge 800 [facility]  [address], [address]  [place], [facility]  Telephone: [phone]      Fax: [phone] home care company:   N/a . Your wound-care supplies will be provided by:  Patient . NAME:  Estelle Woody   YOB: 1940  PRIMARY DIAGNOSIS FOR WOUND CARE CENTER:  Infection . Wound cleansing:  Do not scrub or use excessive force. Wash hands with soap and water before and after dressing changes. Prior to applying a clean dressing, cleanse wound with normal saline, wound cleanser, or mild soap and water. Ask your physician or nurse before getting the wound(s) wet in the shower. Wound care for home:     Abdominal wound:  Antibiotic ointment to wound  Cover with 4x4's, tape  Change dressing once daily         Please note, all wounds (unless stated otherwise here) were mechanically debrided at the time of cleansing here in the wound-care center today, so a small amount of pain, drainage or bleeding from that process might be expected, and is normal.     All products for home use, including multiple products for a single wound if applicable, are medically necessary in order to achieve the best chance at timely wound healing. See provider documentation for details if needed. Substituted dressings applied in the [address] [place] [address],[address] today, if applicable:     N/a     New orders for this week (labs, imaging, medications, etc.):     Leave wound covered with dressing at all times. Do not let wound dry out. Additional instructions for specific diagnoses:     N/a     F/U Appointment is with Dr. Baldo Hill in 2 weeks, on                                   at                       .     Your nurse  is JEFF Baxter.      If we applied slip-resistant hospital socks today, be sure to remove them at least once a day to inspect your toes or feet, even if you're not changing the wraps or dressings

## 2022-08-04 ENCOUNTER — HOSPITAL ENCOUNTER (OUTPATIENT)
Dept: WOUND CARE | Age: 82
Discharge: HOME | End: 2022-08-04

## 2022-08-04 DIAGNOSIS — L98.492 ULCER OF ABDOMEN WALL WITH FAT LAYER EXPOSED (HCC): ICD-10-CM

## 2022-08-04 DIAGNOSIS — L92.9 HYPERGRANULATION: Primary | ICD-10-CM

## 2022-08-05 NOTE — DISCHARGE INSTRUCTIONS
215 [facility] Physician Orders and Discharge [address]  [address], [address]  [place], [facility]  Telephone: [phone]      Fax: [phone] home care company:   N/a . Your wound-care supplies will be provided by:  Patient . NAME:  Aden Quezada   YOB: 1940  PRIMARY DIAGNOSIS FOR WOUND CARE CENTER:  Infection . Wound cleansing:  Do not scrub or use excessive force. Wash hands with soap and water before and after dressing changes. Prior to applying a clean dressing, cleanse wound with normal saline, wound cleanser, or mild soap and water. Ask your physician or nurse before getting the wound(s) wet in the shower. Wound care for home:     Abdominal wound:  Vashe  Triamcinolone nidhi-wound  Collagen  Hydrogel   Skin Prep   Large Mepilex border   Leave in place until dressing comes off. 3-5 days ideal to leave in place. Once Dressing Comes Off if wound is still open: Antibiotic Ointment   Dry Dressing   Change daily        Please note, all wounds (unless stated otherwise here) were mechanically debrided at the time of cleansing here in the wound-care center today, so a small amount of pain, drainage or bleeding from that process might be expected, and is normal.     All products for home use, including multiple products for a single wound if applicable, are medically necessary in order to achieve the best chance at timely wound healing. See provider documentation for details if needed. Substituted dressings applied in the [address] [place] [address],[address] today, if applicable:     N/a     New orders for this week (labs, imaging, medications, etc.):     Leave wound covered with dressing at all times. Do not let wound dry out.      Additional instructions for specific diagnoses:     N/a     F/U Appointment is with Dr. Francisco Pickett in 1 week if wound is still open, if wound is closed please call to cancel appointment on at                       .     Your nurse  is JEFF Baxter. If we applied slip-resistant hospital socks today, be sure to remove them at least once a day to inspect your toes or feet, even if you're not changing the wraps or dressings underneath. If you see anything concerning (redness, excess moisture, etc), please call and let us know right away.      Should you experience any significant changes in your wound(s) (including redness, increased warmth, increased pain, increased drainage, odor, or fever) or have questions about your wound care, please contact the [facility] at [phone] Monday-Thursday from 8:00 am - 4:30 pm, or Friday from 8:00 am - 2:30 pm.  If you need help with your wound outside these hours and cannot wait until we are again available, contact your home-care company (if applicable), your PCP, or go to the nearest emergency room

## 2022-08-11 ENCOUNTER — HOSPITAL ENCOUNTER (OUTPATIENT)
Dept: WOUND CARE | Age: 82
Discharge: HOME | End: 2022-08-11
Payer: MEDICARE

## 2022-08-11 VITALS
WEIGHT: 234.2 LBS | DIASTOLIC BLOOD PRESSURE: 78 MMHG | TEMPERATURE: 97.9 F | RESPIRATION RATE: 18 BRPM | BODY MASS INDEX: 33.53 KG/M2 | HEIGHT: 70 IN | HEART RATE: 81 BPM | SYSTOLIC BLOOD PRESSURE: 141 MMHG

## 2022-08-11 DIAGNOSIS — L98.492 ULCER OF ABDOMEN WALL WITH FAT LAYER EXPOSED (HCC): ICD-10-CM

## 2022-08-11 DIAGNOSIS — L92.9 HYPERGRANULATION: Primary | ICD-10-CM

## 2022-08-11 PROCEDURE — 97597 DBRDMT OPN WND 1ST 20 CM/<: CPT | Performed by: INTERNAL MEDICINE

## 2022-08-11 PROCEDURE — 97597 DBRDMT OPN WND 1ST 20 CM/<: CPT

## 2022-08-11 RX ORDER — LIDOCAINE 40 MG/G
CREAM TOPICAL ONCE
Status: DISCONTINUED | OUTPATIENT
Start: 2022-08-11 | End: 2022-08-12 | Stop reason: HOSPADM

## 2022-08-11 RX ORDER — LIDOCAINE 40 MG/G
CREAM TOPICAL ONCE
Status: CANCELLED | OUTPATIENT
Start: 2022-08-11 | End: 2022-08-11

## 2022-08-11 RX ORDER — LIDOCAINE HYDROCHLORIDE 40 MG/ML
SOLUTION TOPICAL ONCE
Status: CANCELLED | OUTPATIENT
Start: 2022-08-11 | End: 2022-08-11

## 2022-08-11 RX ORDER — LIDOCAINE 50 MG/G
OINTMENT TOPICAL ONCE
Status: CANCELLED | OUTPATIENT
Start: 2022-08-11 | End: 2022-08-11

## 2022-08-11 RX ORDER — BACITRACIN ZINC AND POLYMYXIN B SULFATE 500; 1000 [USP'U]/G; [USP'U]/G
OINTMENT TOPICAL ONCE
Status: CANCELLED | OUTPATIENT
Start: 2022-08-11 | End: 2022-08-11

## 2022-08-11 ASSESSMENT — PAIN SCALES - GENERAL: PAINLEVEL_OUTOF10: 0

## 2022-08-11 NOTE — PLAN OF CARE
Wound remains open this visit. Wound debridement per Dr. Doreen Wolf. Plan to switch to Vashe,Triamcinolone nidhi-wound, Collagen, Hydrogel, Skin Prep, Large Mepilex border. Follow up in [address] [place] [address] in 1 week as ordered. Pt. Aware to call sooner with any problems or questions/concerns. MD orders/D/C instructions reviewed with patient, all questions answered; copy of instructions given to patient.

## 2022-08-12 NOTE — DISCHARGE INSTRUCTIONS
215 [facility] Physician Orders and Discharge [address], [address]  [place], [facility]  Telephone: [phone]      Fax: [phone] home care company:   N/a . Your wound-care supplies will be provided by:  Patient . NAME:  Doreen Gallegos   YOB: 1940  PRIMARY DIAGNOSIS FOR WOUND CARE CENTER:  Infection . Wound cleansing:  Do not scrub or use excessive force. Wash hands with soap and water before and after dressing changes. Prior to applying a clean dressing, cleanse wound with normal saline, wound cleanser, or mild soap and water. Ask your physician or nurse before getting the wound(s) wet in the shower. Wound care for home:     Abdominal wound:   used Silver Nitrate on your wound today. The wound will be black or silver in appearance  for the next several days, this is normal.    Antibiotic Ointment to open wound in middle   Clobetasol to rash on nidhi-wound   Apply Twice a day   Leave open to air, no bandage. Please note, all wounds (unless stated otherwise here) were mechanically debrided at the time of cleansing here in the wound-care center today, so a small amount of pain, drainage or bleeding from that process might be expected, and is normal.     All products for home use, including multiple products for a single wound if applicable, are medically necessary in order to achieve the best chance at timely wound healing. See provider documentation for details if needed. Substituted dressings applied in the [facility] today, if applicable:     N/a     New orders for this week (labs, imaging, medications, etc.):   Please  prescription for clobetasol from [facility] on [address]. Do not let wound dry out.      Additional instructions for specific diagnoses:     N/a     F/U Appointment is with Dr. Stephanie Vigil in 1 week                                          on at                       .     Your nurse  is JEFF Baxter. If we applied slip-resistant hospital socks today, be sure to remove them at least once a day to inspect your toes or feet, even if you're not changing the wraps or dressings underneath. If you see anything concerning (redness, excess moisture, etc), please call and let us know right away.      Should you experience any significant changes in your wound(s) (including redness, increased warmth, increased pain, increased drainage, odor, or fever) or have questions about your wound care, please contact the [employer] at [phone] Monday-Thursday from 8:00 am - 4:30 pm, or Friday from 8:00 am - 2:30 pm.  If you need help with your wound outside these hours and cannot wait until we are again available, contact your home-care company (if applicable), your PCP, or go to the nearest emergency room

## 2022-08-18 ENCOUNTER — HOSPITAL ENCOUNTER (OUTPATIENT)
Dept: WOUND CARE | Age: 82
Discharge: HOME | End: 2022-08-18
Payer: MEDICARE

## 2022-08-18 VITALS
BODY MASS INDEX: 34.04 KG/M2 | HEIGHT: 70 IN | WEIGHT: 237.8 LBS | TEMPERATURE: 97.8 F | RESPIRATION RATE: 18 BRPM | SYSTOLIC BLOOD PRESSURE: 125 MMHG | DIASTOLIC BLOOD PRESSURE: 66 MMHG | HEART RATE: 81 BPM

## 2022-08-18 DIAGNOSIS — L98.492 ULCER OF ABDOMEN WALL WITH FAT LAYER EXPOSED (HCC): ICD-10-CM

## 2022-08-18 DIAGNOSIS — L92.9 HYPERGRANULATION: Primary | ICD-10-CM

## 2022-08-18 DIAGNOSIS — L98.491 ULCER OF ABDOMEN WALL, LIMITED TO BREAKDOWN OF SKIN (HCC): ICD-10-CM

## 2022-08-18 PROCEDURE — 17250 CHEM CAUT OF GRANLTJ TISSUE: CPT | Performed by: INTERNAL MEDICINE

## 2022-08-18 PROCEDURE — 17250 CHEM CAUT OF GRANLTJ TISSUE: CPT

## 2022-08-18 RX ORDER — LIDOCAINE 40 MG/G
CREAM TOPICAL ONCE
Status: DISCONTINUED | OUTPATIENT
Start: 2022-08-18 | End: 2022-08-19 | Stop reason: HOSPADM

## 2022-08-18 RX ORDER — CLOBETASOL PROPIONATE 0.5 MG/G
CREAM TOPICAL
Qty: 30 G | Refills: 0 | Status: SHIPPED | OUTPATIENT
Start: 2022-08-18

## 2022-08-18 RX ORDER — LIDOCAINE 40 MG/G
CREAM TOPICAL ONCE
Status: CANCELLED | OUTPATIENT
Start: 2022-08-18 | End: 2022-08-18

## 2022-08-18 RX ORDER — BACITRACIN ZINC AND POLYMYXIN B SULFATE 500; 1000 [USP'U]/G; [USP'U]/G
OINTMENT TOPICAL ONCE
Status: CANCELLED | OUTPATIENT
Start: 2022-08-18 | End: 2022-08-18

## 2022-08-18 RX ORDER — LIDOCAINE 50 MG/G
OINTMENT TOPICAL ONCE
Status: CANCELLED | OUTPATIENT
Start: 2022-08-18 | End: 2022-08-18

## 2022-08-18 RX ORDER — LIDOCAINE HYDROCHLORIDE 40 MG/ML
SOLUTION TOPICAL ONCE
Status: CANCELLED | OUTPATIENT
Start: 2022-08-18 | End: 2022-08-18

## 2022-08-18 NOTE — PLAN OF CARE
Rash to nidhi-wound today. Silver nitrate per Dr. Ronen Yang to wound bed. Plan to transition to antibiotic ointment to wound bed and clobetasol to rash. Prescription for Clobetasol sent to pharmacy per Dr. Ronen Yang. Follow up in [address] [place] [address] in 1 week as ordered. Pt. Aware to call sooner with any problems or questions/concerns. MD orders/D/C instructions reviewed with patient, all questions answered; copy of instructions given to patient.

## 2022-08-18 NOTE — PROGRESS NOTES
Best 30 Progress Note    Ruth Lopes     : 1940    DATE OF VISIT:  2022    Subjective:     Ruth Lopes is a 81 y.o. male who has an infection-associated ulcer located on the abdomen (on the upper left). Significant symptoms or pertinent wound history since last visit: we haven't seen him in several weeks, and I thought perhaps the ulcer was healed, but he had a little trouble getting here twice, but came back today because of a small persistent area of open wound. Minimal pain, no fever, not much drainage, but a bit of redness and pruritus. Additional ulcer(s) noted? no.      His current medication list consists of Carvedilol, Insulin Degludec, aspirin, omeprazole, rOPINIRole, and simvastatin. Allergies: Patient has no known allergies. Objective:     Vitals:    22 1343   BP: (!) 141/78   Pulse: 81   Resp: 18   Temp: 97.9 °F (36.6 °C)   TempSrc: Oral   Weight: 234 lb 3.2 oz (106.2 kg)   Height: 5' 10\" (1.778 m)     Constitutional:  well-developed, well-nourished, overweight, NAD  Psychiatric:  oriented to person, place and time; mood and affect appropriate for the situation  Abd: soft, NT (outside the wounds), ND, good BS  Lymphatic:  no inguinal adenopathy  [place]-ulcer skin: indurated, pink, warm and increased contact dermatitis from his current dressing  Ulcer(s): the originally larger wound is still open, rather small, red, granular, a bit of fibrin and biofilm, serous exudate, no signs of infection. Photos also saved in electronic chart.     Today's wound measurements, per RN documentation:  Wound 06/10/22 #2 Left Lateral Abdomen, Infection, Full Thickness, Onset 2022-Wound Length (cm): 0.9 cm    Wound 06/10/22 #2 Left Lateral Abdomen, Infection, Full Thickness, Onset 2022-Wound Width (cm): 0.2 cm    Wound 06/10/22 #2 Left Lateral Abdomen, Infection, Full Thickness, Onset 2022-Wound Depth (cm): 0.1 cm    Assessment:     Patient Active Problem List   Diagnosis Code    HTN (hypertension), benign I10    Acid reflux K21.9    Hypercholesteremia E78.00    Diabetic peripheral neuropathy associated with type 2 diabetes mellitus ([facility]) E11.42    History of aortic valve replacement with bioprosthetic valve Z95.3    ROOSEVELT (obstructive sleep apnea) G47.33    CAD S/P percutaneous coronary angioplasty I25.10, Z98.61    S/P left atrial appendage ligation Z98.890    H/O partial nephrectomy Z90.5    Seizure-like activity ([facility]) R56.9    CKD stage 3 due to type 2 diabetes mellitus ([facility]) E11.22, N18.30    Allergic rhinitis J30.9    Benign prostatic hyperplasia N40.0    Claudication ([facility]) I73.9    Obesity E66.9    Plantar fascial fibromatosis M72.2    Restless legs syndrome G25.81    Stenosis of left subclavian artery ([facility]) I77.1    Ulcer of abdomen wall, limited to breakdown of skin ([address] L98.491    Hypergranulation L92.9       Assessment of today's active condition(s): Hx abdominal wall abscess, one area of open wound healed, the other very nearly so, but with some contact dermatitis from dressings; no signs of infection now, no concern for any residual depth. Factors contributing to occurrence and/or persistence of the chronic ulcer include diabetes. Medical necessity of today's visit is shown by the above documentation. Sharp debridement is indicated today, based upon the exam findings in the wound(s) above. Procedure note:     Consent obtained. Time out performed per [facility] policy. Anesthetic  Anesthetic: 4% Lidocaine Cream     Using a curette, I sharply debrided the abdomen (on the upper left) ulcer(s) down through and including the removal of dermis. The type(s) of tissue debrided included fibrin and biofilm. Total Surface Area Debrided: 1 sq cm. The ulcers were then irrigated with normal saline solution. The procedure was completed with a small amount of bleeding, and hemostasis was with pressure.  The patient tolerated the procedure well, with no significant complications. The patient's level of pain during and after the procedure was monitored. Post-debridement measurements, if different from pre-debridement, are in the flowsheet as well. Discharge plan:     Treatment in the wound care center today, per RN documentation: Wound 06/10/22 #2 Left Lateral Abdomen, Infection, Full Thickness, Onset 04/2022-Dressing/Treatment: Other (comment) (vashe,triamcinolone-nidhi,collagen,hydrogel,skin prep,mepilex). Leave that dressing in place as long as it will hold up. When it ultimately does come off, if the wound is still open, just apply a bit of antibiotic ointment and a small BandAid, but I expect this could be healed within the week. Home treatment: good handwashing before and after any dressing changes. Cleanse wound with saline or soap & water before dressing change. May use Vaseline (petrolatum), Aquaphor, Aveeno, CeraVe, Cetaphil, Eucerin, Lubriderm, etc for dry skin. Dressing type for home: as above, as needed for heavy drainage, soiling or loosening. Written discharge instructions given to patient. Follow up in 1-2 weeks, if not healed by then.     Electronically signed by Kristine Dean MD on 8/18/2022 at 10:19 AM.

## 2022-08-19 NOTE — DISCHARGE INSTRUCTIONS
[facility] Physician Orders and Discharge [address] [facility]  [address], [address]  [place], [facility]  Telephone: [phone]      Fax: [phone] home care company:   N/a . Your wound-care supplies will be provided by:  Patient . NAME:  Georgia Wing   YOB: 1940  PRIMARY DIAGNOSIS FOR WOUND CARE CENTER:  Infection . Wound cleansing:  Do not scrub or use excessive force. Wash hands with soap and water before and after dressing changes. Prior to applying a clean dressing, cleanse wound with normal saline, wound cleanser, or mild soap and water. Ask your physician or nurse before getting the wound(s) wet in the shower. Wound care for home:     Abdominal wound: Healed 8/25/22  Moisturize the dry skin as needed   Leave open to air, no bandage. New orders for this week (labs, imaging, medications, etc.):     Patient states he is seeing his PCP Dr Julia Brand on 8/26/22  Patient refusing to go to the ER today for shortness of breath  If you experience worsening shortness of breath, chest pain or pressure or otherwise not feeling well, Call 911 for further evaluation. CONGRATULATIONS! You have completed your treatment program!  We have created a list of general reminders to assist you in preventing future wounds:     1. Check your skin daily for reddened areas, abrasions, or sores. If a new wound is noted, call the [address] at [phone].   2.   Clean your using mild soap and warm (not hot) water, daily. 3.   If your skin appears dry, apply lotion as needed, but not between the toes. 4.   Avoid pressure and trauma to recently healed wounds. The skin is still very fragile and will break down easily. 5.   Always wear socks and well-fitting shoes. Never walk barefoot. 6.   Maintain a nutritious diet, minimizing alcohol, caffeine, and excess sugar.  We can give you recommendations for increasing the amount of protein in your diet, if you're interested. 7. Probably the best thing you can do to prevent wounds in the future is to stop smoking, if you are a current smoker. If we've not discussed this before, please ask about ways that we could help you quit. 8. If you are significantly overweight (a BMI of > 27), [facility] has several different treatment options that can help you. You can contact them at [phone].  ______________________________________________________________________       We strive for improvement. A survey will arrive in the mail from \"[facility]\". We appreciate your comments. Your nurse  is JEFF Baxter. If we applied slip-resistant hospital socks today, be sure to remove them at least once a day to inspect your toes or feet, even if you're not changing the wraps or dressings underneath. If you see anything concerning (redness, excess moisture, etc), please call and let us know right away.      Should you experience any significant changes in your wound(s) (including redness, increased warmth, increased pain, increased drainage, odor, or fever) or have questions about your wound care, please contact the [facility] at [phone] Monday-Thursday from 8:00 am - 4:30 pm, or Friday from 8:00 am - 2:30 pm.  If you need help with your wound outside these hours and cannot wait until we are again available, contact your home-care company (if applicable), your PCP, or go to the nearest emergency room

## 2022-08-25 ENCOUNTER — HOSPITAL ENCOUNTER (OUTPATIENT)
Dept: WOUND CARE | Age: 82
Discharge: HOME | End: 2022-08-25
Payer: MEDICARE

## 2022-08-25 VITALS
WEIGHT: 237 LBS | RESPIRATION RATE: 18 BRPM | DIASTOLIC BLOOD PRESSURE: 61 MMHG | HEIGHT: 70 IN | SYSTOLIC BLOOD PRESSURE: 141 MMHG | TEMPERATURE: 97.7 F | HEART RATE: 71 BPM | BODY MASS INDEX: 33.93 KG/M2

## 2022-08-25 DIAGNOSIS — R06.09 EXERTIONAL DYSPNEA: ICD-10-CM

## 2022-08-25 DIAGNOSIS — L98.491 ULCER OF ABDOMEN WALL, LIMITED TO BREAKDOWN OF SKIN (HCC): ICD-10-CM

## 2022-08-25 DIAGNOSIS — L23.1 ALLERGIC CONTACT DERMATITIS DUE TO ADHESIVES: ICD-10-CM

## 2022-08-25 DIAGNOSIS — L92.9 HYPERGRANULATION: Primary | ICD-10-CM

## 2022-08-25 PROCEDURE — 99213 OFFICE O/P EST LOW 20 MIN: CPT

## 2022-08-25 PROCEDURE — 99213 OFFICE O/P EST LOW 20 MIN: CPT | Performed by: INTERNAL MEDICINE

## 2022-08-25 RX ORDER — LIDOCAINE 50 MG/G
OINTMENT TOPICAL ONCE
Status: CANCELLED | OUTPATIENT
Start: 2022-08-25 | End: 2022-08-25

## 2022-08-25 RX ORDER — LIDOCAINE 40 MG/G
CREAM TOPICAL ONCE
Status: DISCONTINUED | OUTPATIENT
Start: 2022-08-25 | End: 2022-08-26 | Stop reason: HOSPADM

## 2022-08-25 RX ORDER — LIDOCAINE HYDROCHLORIDE 40 MG/ML
SOLUTION TOPICAL ONCE
Status: CANCELLED | OUTPATIENT
Start: 2022-08-25 | End: 2022-08-25

## 2022-08-25 RX ORDER — LIDOCAINE HYDROCHLORIDE 40 MG/ML
SOLUTION TOPICAL ONCE
Status: DISCONTINUED | OUTPATIENT
Start: 2022-08-25 | End: 2022-08-26 | Stop reason: HOSPADM

## 2022-08-25 RX ORDER — LIDOCAINE 50 MG/G
OINTMENT TOPICAL ONCE
Status: DISCONTINUED | OUTPATIENT
Start: 2022-08-25 | End: 2022-08-26 | Stop reason: HOSPADM

## 2022-08-25 RX ORDER — BACITRACIN ZINC AND POLYMYXIN B SULFATE 500; 1000 [USP'U]/G; [USP'U]/G
OINTMENT TOPICAL ONCE
Status: DISCONTINUED | OUTPATIENT
Start: 2022-08-25 | End: 2022-08-26 | Stop reason: HOSPADM

## 2022-08-25 RX ORDER — BACITRACIN ZINC AND POLYMYXIN B SULFATE 500; 1000 [USP'U]/G; [USP'U]/G
OINTMENT TOPICAL ONCE
Status: CANCELLED | OUTPATIENT
Start: 2022-08-25 | End: 2022-08-25

## 2022-08-25 RX ORDER — LIDOCAINE 40 MG/G
CREAM TOPICAL ONCE
Status: CANCELLED | OUTPATIENT
Start: 2022-08-25 | End: 2022-08-25

## 2022-08-25 NOTE — PLAN OF CARE
Patient presented to [facility] today with c/o shortness of breath, diaphoretic, pale & not feeling well. O2 sat 93% on RA, placed on 2 liters nc. Glucose check 133. Pt. Then states he urgently needed to urinate & have a bowel movement. Bedside commode provided & RN remained in room. Dr Behzad Durbin later in to evaluate. Patient denies chest pain or pressure & appears to be resting more calmly. Patient states he has chronic shortness of breath & cardiac history, but admits the shortness of breath is a bit worse the last couple of days. Pt. States his cardiologist & PCP are both aware. Pt. Scheduled with his PCP on 8/26/22. Pt. Refusing to go to the ER for evaluation of shortness of breath today. O2 removed & later checked 98% on RA prior to discharge. Pt. Advised if his symptoms worsen or develops chest pain or pressure to call 911, pt. Verbalizes understanding. Abdominal wound healed, moisturize dry skin as needed. No further follow up in [address] [place] [address] at this time. Pt. Aware if a new wound develops or abdominal wound reopens to call for an appt. Or call with any further questions/concerns. Discharge instructions reviewed with patient, all questions answered, copy given to patient.

## 2022-08-26 RX ORDER — CLOBETASOL PROPIONATE 0.5 MG/G
CREAM TOPICAL
Qty: 30 G | Refills: 0 | OUTPATIENT
Start: 2022-08-26

## 2022-08-29 PROBLEM — L23.1 ALLERGIC CONTACT DERMATITIS DUE TO ADHESIVES: Status: ACTIVE | Noted: 2022-08-29

## 2022-08-29 PROBLEM — R06.09 EXERTIONAL DYSPNEA: Status: ACTIVE | Noted: 2022-08-29

## 2022-08-29 PROBLEM — L98.491 ULCER OF ABDOMEN WALL, LIMITED TO BREAKDOWN OF SKIN (HCC): Status: RESOLVED | Noted: 2022-06-10 | Resolved: 2022-08-29

## 2022-08-29 PROBLEM — L23.1 ALLERGIC CONTACT DERMATITIS DUE TO ADHESIVES: Status: RESOLVED | Noted: 2022-08-29 | Resolved: 2022-08-29

## 2022-08-29 PROBLEM — L92.9 HYPERGRANULATION: Status: RESOLVED | Noted: 2022-06-27 | Resolved: 2022-08-29

## 2022-08-29 NOTE — PROGRESS NOTES
[facility] Progress Note    Wendy Bond     : 1940    DATE OF VISIT:  2022    Subjective:     Wendy Bond is a 81 y.o. male who has an infection-associated ulcer located on the abdomen (on the upper left). Significant symptoms or pertinent wound history since last visit: despite the change in local care last week, his periwound dermatitis is even more significant this week -- some pruritus now also, modest drainage, no real pain, no F/C/D. Additional ulcer(s) noted? no.      His current medication list consists of Carvedilol, Insulin Degludec, aspirin, clobetasol, omeprazole, rOPINIRole, and simvastatin. Allergies: Patient has no known allergies. Objective:     Vitals:    22 1405   BP: 125/66   Pulse: 81   Resp: 18   Temp: 97.8 °F (36.6 °C)   TempSrc: Oral   Weight: 237 lb 12.8 oz (107.9 kg)   Height: 5' 10\" (1.778 m)     Constitutional:  well-developed, well-nourished, overweight, NAD  Psychiatric:  oriented to person, place and time; mood and affect appropriate for the situation  Abd: soft, NT (outside the wounds), ND, good BS  Lymphatic:  no inguinal adenopathy  Georgia-ulcer skin: indurated, pink, warm and even more dramatic contact dermatitis from his last dressing, with some borderline denudation and weeping  Ulcer(s): the originally larger wound is still open, small, red, hypergranular, a bit of loose biofilm, serous exudate, no signs of infection. Photos also saved in electronic chart.     Today's Wound Measurements, per RN documentation:  [REMOVED] Wound 06/10/22 #2 Left Lateral Abdomen, Infection, Full Thickness, Onset 2022-Wound Length (cm): 0.6 cm    [REMOVED] Wound 06/10/22 #2 Left Lateral Abdomen, Infection, Full Thickness, Onset 2022-Wound Width (cm): 1.4 cm    [REMOVED] Wound 06/10/22 #2 Left Lateral Abdomen, Infection, Full Thickness, Onset 2022-Wound Depth (cm): 0.1 cm    Assessment:     Patient Active Problem List   Diagnosis Code    HTN (hypertension), benign I10    Acid reflux K21.9    Hypercholesteremia E78.00    Diabetic peripheral neuropathy associated with type 2 diabetes mellitus (HCC) E11.42    History of aortic valve replacement with bioprosthetic valve Z95.3    ROOSEVELT (obstructive sleep apnea) G47.33    CAD S/P percutaneous coronary angioplasty I25.10, Z98.61    S/P left atrial appendage ligation Z98.890    H/O partial nephrectomy Z90.5    Seizure-like activity ([facility]) R56.9    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, 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    Allergic rhinitis J30.9    Benign prostatic hyperplasia N40.0    Claudication ([facility]) I73.9    Obesity E66.9    Plantar fascial fibromatosis M72.2    Restless legs syndrome G25.81    Stenosis of left subclavian artery ([facility]) I77.1    Ulcer of abdomen wall, limited to breakdown of skin ([address] L98.491    Hypergranulation L92.9       Assessment of today's active condition(s): Hx probably Staph aureus carbuncle / furuncles of LUQ abdominal wall; small one healed pretty quickly, larger one needed some debridement and local care after Abx finished, but now complicated by a pretty significant contact dermatitis around it, whether from drainage or his last dressing(s). Factors contributing to occurrence and/or persistence of the chronic ulcer include diabetes. Medical necessity of today's visit is shown by the above documentation. Sharp debridement is not indicated today, based upon the exam findings in the wound(s) above. Procedure note:     Consent obtained. Time out performed per [facility] policy. Anesthetic  Anesthetic: 4% Lidocaine Cream     To encourage better epithelial cell coverage, I did use AgNO3 to chemically cauterize hypergranulation tissue on the abdomen (on the upper left) ulcer(s). This was tolerated well, with no significant pain or skin injury.      Discharge plan:     Treatment in the wound care center today, per RN documentation: [REMOVED] Wound 06/10/22 #2 Left Lateral Abdomen, Infection, Full Thickness, Onset 04/2022-Dressing/Treatment: Other (comment) (polysporin to wound bed, clobetasol to nidhi-wound rash. ). No ABx needed. I think we're going to have to have him go back to daily \"dressings\" at home, but I think I'd like to just try a couple of topical agents, not necessarily with any sort of cover dressing unless his drainage is substantial enough to need a bandage. I'll call in some clobetasol. Home treatment: good handwashing before and after any dressing changes. Cleanse wound with saline or soap & water before dressing change. May use Vaseline (petrolatum), Aquaphor, Aveeno, CeraVe, Cetaphil, Eucerin, Lubriderm, etc for dry skin. Dressing type for home: as above,  once or twice daily, ideally not with a cover dressing if we can manage it . Written discharge instructions given to patient. Follow up in 1 week.     Electronically signed by Goldie Hunt MD on 8/29/2022 at 12:32 PM.

## 2022-08-29 NOTE — PROGRESS NOTES
[facility] Progress Note    Wendy Bond     : 1940    DATE OF VISIT:  2022    Subjective:     Wendy Bond is a 81 y.o. male who has an infection-associated ulcer located on the abdomen (on the upper left). Current complaint of pain in this ulcer? yes. Quality of pain: burning and sharp  Timing: intermittent and decreasing in frequency  Severity: mild  Associated Signs/Symptoms:  much less redness and itching this week, no drainage  Other significant symptoms or pertinent ulcer history: finally had a really good week with the wound and the surrounding dermatitis. When he came in today, however, he was noting some shortness of breath with exertion. Not brand new today, but he thinks a bit worse than a few weeks ago. No cough, no F/C/D, no CP, not dizzy. He's not sure when he sees his cardiologist next, but he's due to see his PCP tomorrow, he tells us. Additional ulcer(s) noted? no.      Mr. Corey Harrington has a past medical history of Achilles tendon tear, Acid reflux, Arthritis, CAD S/P percutaneous coronary angioplasty, Calculus of gallbladder with acute on chronic cholecystitis without obstruction, Cerebrovascular accident (CVA) ([address] Diabetes mellitus ([address] Diabetic ulcer of left foot associated with type 2 diabetes mellitus ([address] Dysphagia, Heart murmur, Hypercholesteremia, Hypertension, Malignant neoplasm of kidney excluding renal pelvis ([address] ROOSEVELT (obstructive sleep apnea), and Right renal mass. He has a past surgical history that includes Esophagus dilation; Aortic valve replacement; other surgical history (1/30/15); Kidney biopsy (); Colonoscopy; Foot surgery; other surgical history (Right, 2017); ERCP (N/A, 2019); ERCP (2019); Cholecystectomy, laparoscopic (N/A, 2019); Cardiac surgery; Cardiac valuve replacement (); Cardiac surgery (); eye surgery (Left); and eye surgery (Right, 13).     His family history includes Diabetes in his mother; Heart Disease in his father and mother. Mr. Anais Caba reports that he quit smoking about 24 years ago. He has a 30.00 pack-year smoking history. He quit smokeless tobacco use about 38 years ago. His smokeless tobacco use included chew. He reports that he does not drink alcohol and does not use drugs. His current medication list consists of Carvedilol, Insulin Degludec, aspirin, clobetasol, omeprazole, rOPINIRole, and simvastatin. Allergies: Patient has no known allergies. Pertinent items from the review of systems are discussed in the HPI; the remainder of the ROS was reviewed and is negative. Objective:     Vitals:    08/25/22 1459 08/25/22 1520   BP: (!) 162/95 (!) 141/61   Pulse: 97 71   Resp: 18    Temp: 97.7 °F (36.5 °C)    TempSrc: Oral    Weight: 237 lb (107.5 kg)    Height: 5' 10\" (1.778 m)        Constitutional:  well-developed, well-nourished, overweight, was apparently in some resp distress when he first came in, but not now, while at rest and on a bit of nasal O2  Psychiatric:  oriented to person, place and time; mood and affect appropriate for the situation  CV: mild LE edema; heart regular, not tachy, no gallop, no murmur  Chest: slightly decreased and a few crackles at the bases  Abd: soft, NT, ND, good BS  Georgia-ulcer skin: indurated, pink, warm, basically resolved contact dermatitis  Ulcer(s): delicately healed this week. Photos also saved in electronic chart.     Today's Wound Measurements, per RN documentation:  [REMOVED] Wound 06/10/22 #2 Left Lateral Abdomen, Infection, Full Thickness, Onset 04/2022-Wound Length (cm): 0 cm    [REMOVED] Wound 06/10/22 #2 Left Lateral Abdomen, Infection, Full Thickness, Onset 04/2022-Wound Width (cm): 0 cm    [REMOVED] Wound 06/10/22 #2 Left Lateral Abdomen, Infection, Full Thickness, Onset 04/2022-Wound Depth (cm): 0 cm  ______________________________    Lab Results   Component Value Date    LABALBU 3.7 12/22/2021     Lab Results   Component Value Date    CREATININE 1.9 (H) 12/22/2021     Lab Results   Component Value Date    HGB 14.8 12/22/2021     Lab Results   Component Value Date    LABA1C 7.1 10/13/2020     Assessment:     Patient Active Problem List   Diagnosis Code    HTN (hypertension), benign I10    Acid reflux K21.9    Hypercholesteremia E78.00    Diabetic peripheral neuropathy associated with type 2 diabetes mellitus (HCC) E11.42    History of aortic valve replacement with bioprosthetic valve Z95.3    ROOSEVELT (obstructive sleep apnea) G47.33    CAD S/P percutaneous coronary angioplasty I25.10, Z98.61    S/P left atrial appendage ligation Z98.890    H/O partial nephrectomy Z90.5    Seizure-like activity ([facility]) R56.9    CKD stage 3 due to type 2 diabetes mellitus ([facility]) E11.22, N18.30    Allergic rhinitis J30.9    Benign prostatic hyperplasia N40.0    Claudication ([facility]) I73.9    Obesity E66.9    Plantar fascial fibromatosis M72.2    Restless legs syndrome G25.81    Stenosis of left subclavian artery ([facility]) I77.1    Ulcer of abdomen wall, limited to breakdown of skin [address] L98.491    Hypergranulation L92.9    Allergic contact dermatitis due to adhesives L23.1       Assessment of today's active condition(s): Hx of (probably Staph aureus) abdominal wall furuncles / carbuncle, infection resolved, wounds now finally healed, contact dermatitis all but resolved also. Today with some exertional dyspnea however, probably from a bit of fluid overload. Factors contributing to occurrence and/or persistence of the chronic ulcer include diabetes. Medical necessity of today's visit is shown by the above documentation. Sharp debridement is not indicated today, based upon the exam findings in the wound(s) above. Discharge plan:     Treatment in the wound care center today, per RN documentation: none. Definitely doesn't need a true dressing in place. Simple skin cleansing, some moisturizer PRN.   If the dermatitis should flare up, he can briefly go back to a bit of clobetasol for up to a week. Be sure to FU with his PCP tomorrow. I offered an ER evaluation today (for an EKG, CXR, labs, monitoring for a couple of hours), but he declined. If any symptoms should worsen, or new symptoms appear overnight, he must call 911. He understands. Written discharge instructions given to patient. Follow up here PRN.     Electronically signed by Clarke Tay MD on 8/29/2022 at 12:38 PM.

## 2022-11-22 RX ORDER — CLOBETASOL PROPIONATE 0.5 MG/G
CREAM TOPICAL
Qty: 30 G | Refills: 0 | OUTPATIENT
Start: 2022-11-22

## 2023-04-25 RX ORDER — CLOBETASOL PROPIONATE 0.5 MG/G
CREAM TOPICAL
Qty: 30 G | Refills: 0 | OUTPATIENT
Start: 2023-04-25

## 2023-04-27 RX ORDER — CLOBETASOL PROPIONATE 0.5 MG/G
CREAM TOPICAL
Qty: 30 G | Refills: 0 | OUTPATIENT
Start: 2023-04-27

## 2023-05-01 ENCOUNTER — HOSPITAL ENCOUNTER (OUTPATIENT)
Dept: GENERAL RADIOLOGY | Age: 83
Discharge: HOME | End: 2023-05-01
Payer: MEDICARE

## 2023-05-01 ENCOUNTER — HOSPITAL ENCOUNTER (OUTPATIENT)
Age: 83
Discharge: HOME | End: 2023-05-01
Payer: MEDICARE

## 2023-05-01 DIAGNOSIS — R11.10 HABIT VOMITING: ICD-10-CM

## 2023-05-01 PROCEDURE — 74018 RADEX ABDOMEN 1 VIEW: CPT

## 2023-10-30 ENCOUNTER — HOSPITAL ENCOUNTER (OUTPATIENT)
Dept: PHYSICAL THERAPY | Age: 83
Setting detail: THERAPIES SERIES
Discharge: HOME | End: 2023-10-30
Payer: MEDICARE

## 2023-10-30 PROCEDURE — 97110 THERAPEUTIC EXERCISES: CPT

## 2023-10-30 PROCEDURE — 97161 PT EVAL LOW COMPLEX 20 MIN: CPT

## 2023-10-30 PROCEDURE — 97112 NEUROMUSCULAR REEDUCATION: CPT

## 2023-10-30 NOTE — FLOWSHEET NOTE
[address] [place] [address] and [facility], [address], [address] office: [phone] fax: [phone]      Physical Therapy: TREATMENT/PROGRESS NOTE   Patient: Marisa Velasquez (28 y.o. male)   Treatment Date: 10/30/2023   :  1940 MRN: [id number]   Visit #: 1    Insurance: Payor: Claudio Dhruv / Plan: Antonia Watts PPO / Product Type: Medicare /   Insurance ID: [id number] - (Medicare Managed)  Secondary Insurance (if applicable):    Treatment Diagnosis: Decreased balance, ankle strength   Medical Diagnosis:       Unsteadiness on feet [R26.81]   Referring Physician: Lizzy Dumont MD  PCP: Lizzy Dumont MD                             Plan of care signed (Y/N):     Date of Patient follow up with Physician:      Progress Report Due: 23    POC due: 23     Visit # Insurance Allowable Auth Needed   1 BOMN []Yes    []No     Latex Allergy:  [x]NO      []YES  Preferred Language for Healthcare:   [x]English       []other:    Assessment Summary: Marisa Velasquez is a 81 y.o. male presenting today to Outpatient PT with primary complaint of decreased balance which has been occurring last several years. Does report periodic falls without injury. Pt is noted to have good seated strength of hip and knee but decreased ankle strength. TUG time places him at high fall risk.      SUBJECTIVE EXAMINATION     Patient Report/Comments: see eval    OBJECTIVE EXAMINATION     Observation:     Test measurements:      Test used Initial score 10/30/2023   Pain Summary VAS 3    Functional questionnaire LEFS 85%                    RESTRICTIONS/PRECAUTIONS: Fall risk    Exercises/Interventions:     Therapeutic Ex (63295)/Neuro 66646  HEP 10/30/23            Warm-up                     TABLE                                                        SITTING       Sit<>stands x x20     HS stretch x 1' B     Gastroc stretch x 1' B

## 2023-11-07 ENCOUNTER — HOSPITAL ENCOUNTER (EMERGENCY)
Age: 83
Discharge: HOME | End: 2023-11-07
Payer: MEDICARE

## 2023-11-07 ENCOUNTER — APPOINTMENT (OUTPATIENT)
Dept: GENERAL RADIOLOGY | Age: 83
End: 2023-11-07
Payer: MEDICARE

## 2023-11-07 VITALS
RESPIRATION RATE: 15 BRPM | HEIGHT: 70 IN | TEMPERATURE: 97.1 F | HEART RATE: 79 BPM | WEIGHT: 234.8 LBS | OXYGEN SATURATION: 96 % | SYSTOLIC BLOOD PRESSURE: 143 MMHG | DIASTOLIC BLOOD PRESSURE: 73 MMHG | BODY MASS INDEX: 33.61 KG/M2

## 2023-11-07 DIAGNOSIS — I50.9 ACUTE ON CHRONIC CONGESTIVE HEART FAILURE, UNSPECIFIED HEART FAILURE TYPE (HCC): Primary | ICD-10-CM

## 2023-11-07 LAB
ALBUMIN SERPL-MCNC: 4 G/DL (ref 3.4–5)
ALBUMIN/GLOB SERPL: 1.1 {RATIO} (ref 1.1–2.2)
ALP SERPL-CCNC: 135 U/L (ref 40–129)
ALT SERPL-CCNC: 22 U/L (ref 10–40)
ANION GAP SERPL CALCULATED.3IONS-SCNC: 10 MMOL/L (ref 3–16)
AST SERPL-CCNC: 28 U/L (ref 15–37)
BASOPHILS # BLD: 0.1 K/UL (ref 0–0.2)
BASOPHILS NFR BLD: 1.2 %
BILIRUB SERPL-MCNC: 0.8 MG/DL (ref 0–1)
BUN SERPL-MCNC: 22 MG/DL (ref 7–20)
CALCIUM SERPL-MCNC: 8.8 MG/DL (ref 8.3–10.6)
CHLORIDE SERPL-SCNC: 105 MMOL/L (ref 99–110)
CO2 SERPL-SCNC: 27 MMOL/L (ref 21–32)
CREAT SERPL-MCNC: 1.4 MG/DL (ref 0.8–1.3)
DEPRECATED RDW RBC AUTO: 15.9 % (ref 12.4–15.4)
EOSINOPHIL # BLD: 0.4 K/UL (ref 0–0.6)
EOSINOPHIL NFR BLD: 3.6 %
GFR SERPLBLD CREATININE-BSD FMLA CKD-EPI: 50 ML/MIN/{1.73_M2}
GLUCOSE SERPL-MCNC: 134 MG/DL (ref 70–99)
HCT VFR BLD AUTO: 43.3 % (ref 40.5–52.5)
HGB BLD-MCNC: 14.3 G/DL (ref 13.5–17.5)
LYMPHOCYTES # BLD: 1.6 K/UL (ref 1–5.1)
LYMPHOCYTES NFR BLD: 16.6 %
MCH RBC QN AUTO: 31.4 PG (ref 26–34)
MCHC RBC AUTO-ENTMCNC: 33.1 G/DL (ref 31–36)
MCV RBC AUTO: 94.9 FL (ref 80–100)
MONOCYTES # BLD: 0.7 K/UL (ref 0–1.3)
MONOCYTES NFR BLD: 7.6 %
NEUTROPHILS # BLD: 7 K/UL (ref 1.7–7.7)
NEUTROPHILS NFR BLD: 71 %
NT-PROBNP SERPL-MCNC: 2569 PG/ML (ref 0–449)
PLATELET # BLD AUTO: 274 K/UL (ref 135–450)
PMV BLD AUTO: 7.7 FL (ref 5–10.5)
POTASSIUM SERPL-SCNC: 4.1 MMOL/L (ref 3.5–5.1)
PROT SERPL-MCNC: 7.5 G/DL (ref 6.4–8.2)
RBC # BLD AUTO: 4.56 M/UL (ref 4.2–5.9)
SODIUM SERPL-SCNC: 142 MMOL/L (ref 136–145)
WBC # BLD AUTO: 9.8 K/UL (ref 4–11)

## 2023-11-07 PROCEDURE — 96374 THER/PROPH/DIAG INJ IV PUSH: CPT

## 2023-11-07 PROCEDURE — 80053 COMPREHEN METABOLIC PANEL: CPT

## 2023-11-07 PROCEDURE — 85025 COMPLETE CBC W/AUTO DIFF WBC: CPT

## 2023-11-07 PROCEDURE — 99285 EMERGENCY DEPT VISIT HI MDM: CPT

## 2023-11-07 PROCEDURE — 83880 ASSAY OF NATRIURETIC PEPTIDE: CPT

## 2023-11-07 PROCEDURE — 6370000000 HC RX 637 (ALT 250 FOR IP): Performed by: PHYSICIAN ASSISTANT

## 2023-11-07 PROCEDURE — 94640 AIRWAY INHALATION TREATMENT: CPT

## 2023-11-07 PROCEDURE — 71046 X-RAY EXAM CHEST 2 VIEWS: CPT

## 2023-11-07 PROCEDURE — 6360000002 HC RX W HCPCS: Performed by: PHYSICIAN ASSISTANT

## 2023-11-07 PROCEDURE — 93005 ELECTROCARDIOGRAM TRACING: CPT | Performed by: EMERGENCY MEDICINE

## 2023-11-07 RX ORDER — FUROSEMIDE 20 MG/1
20 TABLET ORAL DAILY
Qty: 60 TABLET | Refills: 3 | Status: SHIPPED | OUTPATIENT
Start: 2023-11-07

## 2023-11-07 RX ORDER — FUROSEMIDE 10 MG/ML
40 INJECTION INTRAMUSCULAR; INTRAVENOUS ONCE
Status: COMPLETED | OUTPATIENT
Start: 2023-11-07 | End: 2023-11-07

## 2023-11-07 RX ORDER — IPRATROPIUM BROMIDE AND ALBUTEROL SULFATE 2.5; .5 MG/3ML; MG/3ML
1 SOLUTION RESPIRATORY (INHALATION) ONCE
Status: COMPLETED | OUTPATIENT
Start: 2023-11-07 | End: 2023-11-07

## 2023-11-07 RX ADMIN — FUROSEMIDE 40 MG: 10 INJECTION, SOLUTION INTRAMUSCULAR; INTRAVENOUS at 14:20

## 2023-11-07 RX ADMIN — NITROGLYCERIN 0.5 INCH: 20 OINTMENT TOPICAL at 14:20

## 2023-11-07 RX ADMIN — IPRATROPIUM BROMIDE AND ALBUTEROL SULFATE 1 DOSE: 2.5; .5 SOLUTION RESPIRATORY (INHALATION) at 15:08

## 2023-11-07 ASSESSMENT — PAIN DESCRIPTION - LOCATION: LOCATION: LEG

## 2023-11-07 ASSESSMENT — PAIN - FUNCTIONAL ASSESSMENT: PAIN_FUNCTIONAL_ASSESSMENT: 0-10

## 2023-11-07 ASSESSMENT — PAIN SCALES - GENERAL: PAINLEVEL_OUTOF10: 2

## 2023-11-07 ASSESSMENT — PAIN DESCRIPTION - PAIN TYPE: TYPE: CHRONIC PAIN

## 2023-11-07 NOTE — ED PROVIDER NOTES
The Ekg interpreted by me shows  normal sinus rhythm with a rate of 78  Axis is   46  QTc is   522 msec  RBBB  ST Segments: nonspecific changes  When compared to an EKG performed on December 22, 2021 anterior lateral ST changes are not as apparent.          Yanira Kang MD  11/07/23 8694
crackles  Abdomen: soft, distended, no tenderness to palpation   Extremities:  cap refill <2 UE/LE, no tenderness of calves, mild bilateral leg edema  Neuro: no facial droop, no slurred speech, answers questions appropriately. Skin: Warm. No visible rash, lesions, or bruising       DIAGNOSTIC RESULTS   LABS:    Labs Reviewed   CBC WITH AUTO DIFFERENTIAL - Abnormal; Notable for the following components:       Result Value    RDW 15.9 (*)     All other components within normal limits   COMPREHENSIVE METABOLIC PANEL W/ REFLEX TO MG FOR LOW K - Abnormal; Notable for the following components:    Glucose 134 (*)     BUN 22 (*)     Creatinine 1.4 (*)     Est, Glom Filt Rate 50 (*)     Alkaline Phosphatase 135 (*)     All other components within normal limits   BRAIN NATRIURETIC PEPTIDE - Abnormal; Notable for the following components:    Pro-BNP 2,569 (*)     All other components within normal limits       EKG: When ordered, EKG's are interpreted by the Emergency Department Physician in the absence of a cardiologist.  Please see their note for interpretation of EKG. RADIOLOGY:   XR CHEST (2 VW)   Final Result   Mild cardiomegaly. Mild-to-moderate congestion and/or infiltrates identified   in the lungs. Bilateral tiny pleural effusions. No pneumothorax. XR CHEST (2 VW)    Result Date: 11/7/2023  EXAMINATION: TWO XRAY VIEWS OF THE CHEST 11/7/2023 1:27 pm COMPARISON: None. HISTORY: ORDERING SYSTEM PROVIDED HISTORY: shortness of breath TECHNOLOGIST PROVIDED HISTORY: Reason for exam:->shortness of breath Reason for Exam: SOB FINDINGS: Mild cardiomegaly. Median sternotomy changes. Mild-to-moderate congestion and/or infiltrates identified in the lungs. Bilateral tiny pleural effusions. No pneumothorax. Significant osteopenic changes and degenerative changes noted in the bony structures. .     Mild cardiomegaly. Mild-to-moderate congestion and/or infiltrates identified in the lungs.   Bilateral tiny pleural

## 2023-11-07 NOTE — ED NOTES
Ambulated patient with portable pulse oximeter. While resting patient had HR of ~79 and O2 at 95%. While ambulating patient had HR of ~86 and O2 at 95%. Patient ambulated well, and did not require any assistance from [facility].       Agustin Tamayo  11/07/23 1558

## 2023-11-08 LAB
EKG ATRIAL RATE: 78 BPM
EKG DIAGNOSIS: NORMAL
EKG P AXIS: 46 DEGREES
EKG P-R INTERVAL: 192 MS
EKG Q-T INTERVAL: 458 MS
EKG QRS DURATION: 144 MS
EKG QTC CALCULATION (BAZETT): 522 MS
EKG R AXIS: 29 DEGREES
EKG T AXIS: 45 DEGREES
EKG VENTRICULAR RATE: 78 BPM

## 2023-11-08 PROCEDURE — 93010 ELECTROCARDIOGRAM REPORT: CPT | Performed by: INTERNAL MEDICINE

## 2023-12-23 ENCOUNTER — HOSPITAL ENCOUNTER (EMERGENCY)
Age: 83
Discharge: HOME | End: 2023-12-23
Attending: EMERGENCY MEDICINE
Payer: MEDICARE

## 2023-12-23 VITALS
SYSTOLIC BLOOD PRESSURE: 136 MMHG | HEART RATE: 67 BPM | TEMPERATURE: 98.1 F | DIASTOLIC BLOOD PRESSURE: 80 MMHG | RESPIRATION RATE: 16 BRPM | OXYGEN SATURATION: 100 %

## 2023-12-23 DIAGNOSIS — Z46.6 ENCOUNTER FOR FOLEY CATHETER REMOVAL: Primary | ICD-10-CM

## 2023-12-23 PROCEDURE — 99282 EMERGENCY DEPT VISIT SF MDM: CPT

## 2023-12-23 NOTE — DISCHARGE INSTRUCTIONS
Please return to the hospital if you are not able to urinate.   Please follow-up with your urologist.

## 2024-04-22 ENCOUNTER — OFFICE VISIT (OUTPATIENT)
Dept: ORTHOPEDIC SURGERY | Age: 84
End: 2024-04-22
Payer: COMMERCIAL

## 2024-04-22 VITALS — BODY MASS INDEX: 33.64 KG/M2 | HEIGHT: 70 IN | WEIGHT: 235 LBS

## 2024-04-22 DIAGNOSIS — M70.41 PREPATELLAR BURSITIS OF RIGHT KNEE: Primary | ICD-10-CM

## 2024-04-22 DIAGNOSIS — M25.561 RIGHT KNEE PAIN, UNSPECIFIED CHRONICITY: ICD-10-CM

## 2024-04-22 PROCEDURE — 99204 OFFICE O/P NEW MOD 45 MIN: CPT | Performed by: ORTHOPAEDIC SURGERY

## 2024-04-22 PROCEDURE — 1124F ACP DISCUSS-NO DSCNMKR DOCD: CPT | Performed by: ORTHOPAEDIC SURGERY

## 2024-04-22 NOTE — PROGRESS NOTES
ORTHOPAEDIC SURGERY H&P / CONSULTATION NOTE    Chief complaint:   Chief Complaint   Patient presents with    Knee Pain     NP R KNEE        History of present illness: The patient is a 84 y.o. male with subjective symptoms of right knee pain. The chief complaint is located at anterior aspect right knee. Duration of symptoms has been for several weeks worsening however noticeable over the last 4 to 6 months. The severity of symptoms is rated at 5/10 pain on intake form.  Patient is accompanied by his wife.  He states that he has anterior based knee pain and is noticed slight swelling on the anterior aspect of the knee.  Denies fevers or chills.  Denies therapy.  Denies anti-inflammatories, he has occasionally used the gel on the front part of the knee.  Denies corticosteroid injections.  States pain occasionally with bending down and directly kneeling on the knee    The patient has tried the below listed items prior to today's consultation for above listed chief complaint.     -   Over-the-counter anti-inflammatories/prescription medication anti-inflammatory.     -   Physical therapy / guided home exercise program -     -   Previous corticosteroid injections    Past medical history:    Past Medical History:   Diagnosis Date    Abscess of abdominal wall 05/2022    resulting ulcers healed Aug 2022    Achilles tendon tear     Acid reflux     Arthritis     CAD S/P percutaneous coronary angioplasty 10/12/2020    Calculus of gallbladder with acute on chronic cholecystitis without obstruction 07/18/2019    Cerebrovascular accident (CVA) (HCC)     Diabetes mellitus (HCC)     Diabetic ulcer of left foot associated with type 2 diabetes mellitus (HCC) 08/15/2016    Dysphagia 02/23/2012    Formatting of this note might be different from the original. S/p esophageal dilation 2011    Heart murmur H/O    Hypercholesteremia     Hypertension     Malignant neoplasm of kidney excluding renal pelvis (HCC) 06/09/2022    ROOSEVELT (obstructive

## (undated) DEVICE — TROCAR ENDOSCP L100MM DIA5MM BLDELSS STBL SL THRD OPT VW

## (undated) DEVICE — ELECTRODE PT RET AD L9FT HI MOIST COND ADH HYDRGEL CORDED

## (undated) DEVICE — 3M™ STERI-STRIP™ REINFORCED ADHESIVE SKIN CLOSURES, R1540, 1/8 IN X 3 IN (3 MM X 75 MM), 5 STRIPS/ENVELOPE: Brand: 3M™ STERI-STRIP™

## (undated) DEVICE — SYRINGE MED 10ML LUERLOCK TIP W/O SFTY DISP

## (undated) DEVICE — TROCAR ENDOSCP L100MM DIA5MM BLDELSS STBL SL OBT RADLUC

## (undated) DEVICE — DRAPE C ARM UNIV W41XL74IN CLR PLAS XR VELC CLSR POLY STRP

## (undated) DEVICE — TUBING INSUF ISO CONN DISP

## (undated) DEVICE — FUSION WIRE GUIDE LOCKING DEVICE: Brand: FUSION

## (undated) DEVICE — KIT,ANTI FOG,W/SPONGE & FLUID,SOFT PACK: Brand: MEDLINE

## (undated) DEVICE — APPLICATOR PREP 26ML 0.7% IOD POVACRYLEX 74% ISO ALC ST

## (undated) DEVICE — GLOVE ORANGE PI 7 1/2   MSG9075

## (undated) DEVICE — GAUZE SPONGES,8 PLY: Brand: CURITY

## (undated) DEVICE — SPHINCTEROTOME: Brand: JAGTOME RX 39

## (undated) DEVICE — RETRIEVAL BALLOON CATHETER: Brand: EXTRACTOR™ PRO RX

## (undated) DEVICE — GOWN SIRUS NONREIN XL W/TWL: Brand: MEDLINE INDUSTRIES, INC.

## (undated) DEVICE — TROCAR ENDOSCP L100MM DIA11MM STBL SL BLDELSS DISP ENDOPATH

## (undated) DEVICE — TISSUE RETRIEVAL SYSTEM: Brand: INZII RETRIEVAL SYSTEM

## (undated) DEVICE — Device: Brand: MEDEX

## (undated) DEVICE — DRAPE,ABDOMINAL,MAJOR,STERILE: Brand: MEDLINE

## (undated) DEVICE — ENDO CARRY-ON PROCEDURE KIT INCLUDES SUCTION TUBING, LUBRICANT, GAUZE, BIOHAZARD STICKER, TRANSPORT PAD AND INTERCEPT BEDSIDE KIT.: Brand: ENDO CARRY-ON PROCEDURE KIT

## (undated) DEVICE — THE DISPOSABLE INFINITY ERCP SAMPLING DEVICE IS USED TO RETRIEVE CYTOLOGICAL CELL SAMPLES IN THE GASTROINTESTINAL TRACT.: Brand: INFINITY

## (undated) DEVICE — 3M™ TEGADERM™ TRANSPARENT FILM DRESSING FRAME STYLE, 1624W, 2-3/8 IN X 2-3/4 IN (6 CM X 7 CM), 100/CT 4CT/CASE: Brand: 3M™ TEGADERM™

## (undated) DEVICE — SOLUTION IV IRRIG 500ML 0.9% SODIUM CHL 2F7123

## (undated) DEVICE — MAJOR SET UP PK

## (undated) DEVICE — STANDARD HYPODERMIC NEEDLE,POLYPROPYLENE HUB: Brand: MONOJECT

## (undated) DEVICE — ELECTRODE ES OD5 MM ST DISPOSABLE

## (undated) DEVICE — CATHETER CHOLGM 4.5FR L18IN W/ MTL SUPP TB

## (undated) DEVICE — YANKAUER,BULB TIP,W/O VENT,RIGID,STERILE: Brand: MEDLINE

## (undated) DEVICE — CONMED SCOPE SAVER BITE BLOCK, 20X27 MM: Brand: SCOPE SAVER

## (undated) DEVICE — CIRCUIT ANES L72IN 3L BACT AND VIR FLTR EL CONN SGL LIMB

## (undated) DEVICE — TUBING, SUCTION, 3/16" X 10', STRAIGHT: Brand: MEDLINE

## (undated) DEVICE — 3M™ STERI-STRIP™ COMPOUND BENZOIN TINCTURE 40 BAGS/CARTON 4 CARTONS/CASE C1544: Brand: 3M™ STERI-STRIP™

## (undated) DEVICE — CORD ES L10FT MPLR LAP

## (undated) DEVICE — SUTURE ETHBND EXCEL SZ 0 L18IN NONABSORBABLE GRN L22MM MO-7 CX41D

## (undated) DEVICE — SUTURE VCRL SZ 4-0 L18IN ABSRB UD L19MM PS-2 3/8 CIR PRIM J496H

## (undated) DEVICE — PUMP SUC IRR TBNG L10FT W/ HNDPC ASSEMB STRYKEFLOW 2

## (undated) DEVICE — APPLIER CLP M L L11.4IN DIA10MM ENDOSCP ROT MULT FOR LIG